# Patient Record
Sex: FEMALE | Race: WHITE | NOT HISPANIC OR LATINO | Employment: FULL TIME | URBAN - METROPOLITAN AREA
[De-identification: names, ages, dates, MRNs, and addresses within clinical notes are randomized per-mention and may not be internally consistent; named-entity substitution may affect disease eponyms.]

---

## 2017-03-03 ENCOUNTER — OFFICE VISIT (OUTPATIENT)
Dept: FAMILY MEDICINE CLINIC | Facility: CLINIC | Age: 27
End: 2017-03-03

## 2017-03-03 VITALS
BODY MASS INDEX: 40.88 KG/M2 | DIASTOLIC BLOOD PRESSURE: 78 MMHG | OXYGEN SATURATION: 99 % | HEART RATE: 103 BPM | HEIGHT: 69 IN | WEIGHT: 276 LBS | SYSTOLIC BLOOD PRESSURE: 144 MMHG

## 2017-03-03 DIAGNOSIS — R03.0 ELEVATED BLOOD PRESSURE (NOT HYPERTENSION): Primary | ICD-10-CM

## 2017-03-03 DIAGNOSIS — E28.2 PCOS (POLYCYSTIC OVARIAN SYNDROME): ICD-10-CM

## 2017-03-03 LAB — HBA1C MFR BLD: 5.6 %

## 2017-03-03 PROCEDURE — 83036 HEMOGLOBIN GLYCOSYLATED A1C: CPT | Performed by: FAMILY MEDICINE

## 2017-03-03 PROCEDURE — 99213 OFFICE O/P EST LOW 20 MIN: CPT | Performed by: FAMILY MEDICINE

## 2017-03-03 RX ORDER — DROSPIRENONE AND ETHINYL ESTRADIOL 0.03MG-3MG
1 KIT ORAL DAILY
Qty: 28 TABLET | Refills: 12 | Status: SHIPPED | OUTPATIENT
Start: 2017-03-03 | End: 2018-02-05 | Stop reason: SDUPTHER

## 2017-03-03 NOTE — PROGRESS NOTES
Subjective   Windy Lam is a 26 y.o. female.     Chief Complaint   Patient presents with   • Polycystic Ovary Syndrome     Social History   Substance Use Topics   • Smoking status: Never Smoker   • Smokeless tobacco: None   • Alcohol use Yes      Comment: social       History of Present Illness     She has lost some weight. Her BP always goes up when she goes to the doctor or [facility]. She hasn't checked it any where else recently. Menses have been coming regularly. She is exercising at least three times weekly. Her dietary intake is better. Has been reducing her carbs and she feels like she is eating more healthily.      The following portions of the patient's history were reviewed and updated as appropriate: allergies, current medications, past social history and problem list.      Review of Systems   Constitutional: Negative for activity change, appetite change, chills, fatigue, fever and unexpected weight change.   HENT: Negative for congestion, ear pain, hearing loss, nosebleeds, rhinorrhea and sore throat.    Eyes: Negative for pain, redness and visual disturbance.   Respiratory: Negative for cough, shortness of breath and wheezing.    Cardiovascular: Negative for chest pain, palpitations and leg swelling.   Gastrointestinal: Negative for abdominal pain, blood in stool, constipation, diarrhea, nausea and vomiting.   Endocrine: Negative for cold intolerance and heat intolerance.   Genitourinary: Negative for difficulty urinating, dysuria, frequency, hematuria, pelvic pain, urgency and vaginal discharge.   Musculoskeletal: Negative for arthralgias, back pain and joint swelling.   Skin: Negative for rash and wound.   Neurological: Negative for dizziness, weakness, numbness and headaches.   Hematological: Does not bruise/bleed easily.   Psychiatric/Behavioral: Negative for dysphoric mood, sleep disturbance and suicidal ideas. The patient is not nervous/anxious.        Objective   Vitals:    03/03/17 1315 03/03/17  "1341   BP: 160/100 144/78   Pulse: 103    SpO2: 99%    Weight: 276 lb (125 kg)    Height: 69\" (175.3 cm)      Body mass index is 40.76 kg/(m^2).    Physical Exam   Constitutional: She is oriented to person, place, and time. Vital signs are normal. She appears well-developed and well-nourished. No distress.   HENT:   Head: Normocephalic.   Cardiovascular: Normal rate, regular rhythm and normal heart sounds.    Pulmonary/Chest: Effort normal and breath sounds normal.   Neurological: She is alert and oriented to person, place, and time. Gait normal.   Psychiatric: She has a normal mood and affect. Her behavior is normal. Judgment and thought content normal.   Vitals reviewed.      Assessment/Plan   Problem List Items Addressed This Visit        Endocrine    PCOS (polycystic ovarian syndrome)    Relevant Medications    drospirenone-ethinyl estradiol (FELIPE,OCELLA) 3-0.03 MG per tablet    Other Relevant Orders    POC Glycosylated Hemoglobin (Hb A1C) (Completed)      Other Visit Diagnoses     Elevated blood pressure (not hypertension)    -  Primary    Patient will monitor her blood pressure.  She understands that if the pressure remains elevated it needs to be treated.  She has a large reactive component.           "

## 2017-09-15 ENCOUNTER — OFFICE VISIT (OUTPATIENT)
Dept: FAMILY MEDICINE CLINIC | Facility: CLINIC | Age: 27
End: 2017-09-15

## 2017-09-15 VITALS
HEIGHT: 69 IN | DIASTOLIC BLOOD PRESSURE: 84 MMHG | BODY MASS INDEX: 41.18 KG/M2 | WEIGHT: 278 LBS | HEART RATE: 104 BPM | OXYGEN SATURATION: 99 % | SYSTOLIC BLOOD PRESSURE: 152 MMHG

## 2017-09-15 DIAGNOSIS — F41.9 ANXIETY: Primary | ICD-10-CM

## 2017-09-15 PROCEDURE — 99213 OFFICE O/P EST LOW 20 MIN: CPT | Performed by: FAMILY MEDICINE

## 2017-09-15 RX ORDER — TRIAMCINOLONE ACETONIDE 1 MG/G
CREAM TOPICAL
Refills: 0 | COMMUNITY
Start: 2017-08-11 | End: 2019-03-15 | Stop reason: SDUPTHER

## 2017-09-15 RX ORDER — FERROUS SULFATE TAB EC 324 MG (65 MG FE EQUIVALENT) 324 (65 FE) MG
324 TABLET DELAYED RESPONSE ORAL
COMMUNITY
End: 2018-09-26

## 2017-09-15 NOTE — PROGRESS NOTES
Windy Lam is a 27 y.o. female.  Seen 09/15/2017    Assessment/Plan   Problem List Items Addressed This Visit        Other    Anxiety - Primary    Relevant Medications    sertraline (ZOLOFT) 50 MG tablet             No Follow-up on file.  There are no Patient Instructions on file for this visit.    Subjective     Chief Complaint   Patient presents with   • Anxiety     Social History   Substance Use Topics   • Smoking status: Never Smoker   • Smokeless tobacco: Never Used   • Alcohol use Yes      Comment: social       History of Present Illness     She has terrible anxiety. It's been going on for years but now it's worse for the last two years. Was given an antianxiety med then but didn't take it. She has had some panic attacks, 4-5 of those where she can't breathe. She is anxious every day. Her marriage and job is good.  She has some OCD behaviors, for instance right now she is obsessing about her hair falling out. Denies alcohol or substance use. She is exercising four times a week and that may be helping some. She tosses and turns -- difficulty staying asleep. Denies depression symptoms. Grandmother maybe has some anxiety issues. Not hormonally related. She did some counseling through [facility] about a year ago for depression. It helped the depression but not so much the anxiety. Not tried meditation.     Having some sore throat and URI symptoms since Monday but that is the secondary.    The following portions of the patient's history were reviewed and updated as appropriate:PMHroutine: Social history , Allergies, Current Medications, Active Problem List and Health Maintenance    Review of Systems   Constitutional: Negative for activity change, appetite change, chills, fatigue, fever and unexpected weight change.   HENT: Positive for postnasal drip. Negative for congestion, ear pain, hearing loss, nosebleeds, rhinorrhea and sore throat.    Eyes: Negative for pain, redness and visual disturbance.   Respiratory:  "Positive for cough. Negative for shortness of breath and wheezing.    Cardiovascular: Negative for chest pain, palpitations and leg swelling.   Gastrointestinal: Negative for abdominal pain, blood in stool, constipation, diarrhea, nausea and vomiting.   Endocrine: Negative for cold intolerance and heat intolerance.   Genitourinary: Negative for difficulty urinating, dysuria, frequency, hematuria, pelvic pain, urgency and vaginal discharge.   Musculoskeletal: Negative for arthralgias, back pain and joint swelling.   Skin: Negative for rash and wound.   Neurological: Negative for dizziness, weakness, numbness and headaches.   Hematological: Does not bruise/bleed easily.   Psychiatric/Behavioral: Positive for agitation and sleep disturbance. Negative for dysphoric mood and suicidal ideas. The patient is nervous/anxious.        Objective   Vitals:    09/15/17 1332   BP: 152/84   Pulse: 104   SpO2: 99%   Weight: 278 lb (126 kg)   Height: 69\" (175.3 cm)     Body mass index is 41.05 kg/(m^2).  Physical Exam   Constitutional: She appears well-developed and well-nourished.   Psychiatric: Her behavior is normal. Judgment and thought content normal. Her mood appears anxious. Her speech is rapid and/or pressured.   Vitals reviewed.    Reviewed Data:  No visits with results within 1 Month(s) from this visit.  Latest known visit with results is:    Office Visit on 03/03/2017   Component Date Value Ref Range Status   • Hemoglobin A1C 03/03/2017 5.6  % Final         "

## 2018-01-26 ENCOUNTER — OFFICE VISIT (OUTPATIENT)
Dept: FAMILY MEDICINE CLINIC | Facility: CLINIC | Age: 28
End: 2018-01-26

## 2018-01-26 VITALS
SYSTOLIC BLOOD PRESSURE: 148 MMHG | HEIGHT: 69 IN | BODY MASS INDEX: 41.77 KG/M2 | OXYGEN SATURATION: 100 % | WEIGHT: 282 LBS | DIASTOLIC BLOOD PRESSURE: 90 MMHG | HEART RATE: 95 BPM

## 2018-01-26 DIAGNOSIS — H93.11 TINNITUS OF RIGHT EAR: Primary | ICD-10-CM

## 2018-01-26 DIAGNOSIS — R03.0 ELEVATED BP WITHOUT DIAGNOSIS OF HYPERTENSION: ICD-10-CM

## 2018-01-26 DIAGNOSIS — H92.01 RIGHT EAR PAIN: ICD-10-CM

## 2018-01-26 PROCEDURE — 99213 OFFICE O/P EST LOW 20 MIN: CPT | Performed by: FAMILY MEDICINE

## 2018-01-26 NOTE — PROGRESS NOTES
Windy Lam is a 27 y.o. female.      Assessment/Plan   Problem List Items Addressed This Visit     None      Visit Diagnoses     Tinnitus of right ear    -  Primary    Monitor for one week. If persists needs to see ENT.     Right ear pain        Elevated BP without diagnosis of hypertension        Monitor BP with elevated pressure persistent will need tx.              Return if symptoms worsen or fail to improve.  There are no Patient Instructions on file for this visit.    Chief Complaint   Patient presents with   • Earache     x 5 days      Social History   Substance Use Topics   • Smoking status: Never Smoker   • Smokeless tobacco: Never Used   • Alcohol use Yes      Comment: social       History of Present Illness     Right ear feels full. Nothing makes it better or worse. Trying to pop it she hasn't tried. No popping sounds in it. Wants to put her ear on a pillow though that doesn't help. Not quite sharp -- no particular time of the day that it hurts and it gets better and worse throughout the day. No recent cold. Allergies are not bad. Not aware that she grinds her teeth. Hasn't taken anything for it. Also having ringing in that ear only.     Having episodes where her face gets red and hot almost every day. Feeling it today and BP is elevated here.     The following portions of the patient's history were reviewed and updated as appropriate:PMHroutine: Social history , Allergies, Current Medications, Active Problem List and Health Maintenance    Review of Systems   Constitutional: Negative for activity change, appetite change, chills, fatigue, fever and unexpected weight change.   HENT: Positive for ear pain. Negative for congestion, hearing loss, nosebleeds, rhinorrhea and sore throat.    Eyes: Negative for pain, redness and visual disturbance.   Respiratory: Negative for cough, shortness of breath and wheezing.    Cardiovascular: Negative for chest pain, palpitations and leg swelling.   Gastrointestinal:  "Negative for abdominal pain, blood in stool, constipation, diarrhea, nausea and vomiting.   Endocrine: Negative for cold intolerance and heat intolerance.   Genitourinary: Negative for difficulty urinating, dysuria, frequency, hematuria, pelvic pain, urgency and vaginal discharge.   Musculoskeletal: Negative for arthralgias, back pain and joint swelling.   Skin: Negative for rash and wound.   Neurological: Negative for dizziness, weakness, numbness and headaches.   Hematological: Does not bruise/bleed easily.   Psychiatric/Behavioral: Negative for dysphoric mood, sleep disturbance and suicidal ideas. The patient is not nervous/anxious.        Objective   Vitals:    01/26/18 1050   BP: 148/90   Pulse: 95   SpO2: 100%   Weight: 128 kg (282 lb)   Height: 175.3 cm (69.02\")     Body mass index is 41.62 kg/(m^2).  Physical Exam   Constitutional: She appears well-developed and well-nourished.   HENT:   Right Ear: Tympanic membrane, external ear and ear canal normal.   Left Ear: Tympanic membrane, external ear and ear canal normal.   Eyes: Conjunctivae are normal.   Neck: Normal range of motion. Neck supple. No thyromegaly present.   Lymphadenopathy:     She has no cervical adenopathy.   Vitals reviewed.    Reviewed Data:        "

## 2018-02-01 DIAGNOSIS — H93.11 TINNITUS, RIGHT EAR: Primary | ICD-10-CM

## 2018-02-05 ENCOUNTER — OFFICE VISIT (OUTPATIENT)
Dept: FAMILY MEDICINE CLINIC | Facility: CLINIC | Age: 28
End: 2018-02-05

## 2018-02-05 VITALS
OXYGEN SATURATION: 99 % | SYSTOLIC BLOOD PRESSURE: 138 MMHG | HEART RATE: 96 BPM | HEIGHT: 69 IN | DIASTOLIC BLOOD PRESSURE: 90 MMHG

## 2018-02-05 DIAGNOSIS — E28.2 PCOS (POLYCYSTIC OVARIAN SYNDROME): ICD-10-CM

## 2018-02-05 DIAGNOSIS — I10 BENIGN ESSENTIAL HYPERTENSION: Primary | ICD-10-CM

## 2018-02-05 PROCEDURE — 99213 OFFICE O/P EST LOW 20 MIN: CPT | Performed by: FAMILY MEDICINE

## 2018-02-05 RX ORDER — LISINOPRIL 10 MG/1
10 TABLET ORAL DAILY
Qty: 30 TABLET | Refills: 1 | Status: SHIPPED | OUTPATIENT
Start: 2018-02-05 | End: 2018-04-03 | Stop reason: SDUPTHER

## 2018-02-05 RX ORDER — AMOXICILLIN 500 MG/1
CAPSULE ORAL
Refills: 0 | COMMUNITY
Start: 2018-02-02 | End: 2018-04-03

## 2018-02-05 RX ORDER — DROSPIRENONE AND ETHINYL ESTRADIOL 0.03MG-3MG
1 KIT ORAL DAILY
Qty: 84 TABLET | Refills: 0 | Status: SHIPPED | OUTPATIENT
Start: 2018-02-05 | End: 2018-04-03 | Stop reason: SDUPTHER

## 2018-02-05 NOTE — PROGRESS NOTES
Windy Lam is a 27 y.o. female.      Assessment/Plan   Problem List Items Addressed This Visit        Cardiovascular and Mediastinum    Benign essential hypertension - Primary    [facility]  Disc lisinopril side effects and hopefully with lower BP it will take care of the tinnitis.             Endocrine    PCOS (polycystic ovarian syndrome)    Overview     [facility]  She never gets the same OCP generic cali in a row and having menstrual irregularities and other issues she thinks is re to the constant changing of the hormone levels.                   No Follow-up on file.  There are no Patient Instructions on file for this visit.    Chief Complaint   Patient presents with   • Hypertension     [facility] F/U 2/2/18   • Tinnitus     Social History   Substance Use Topics   • Smoking status: Never Smoker   • Smokeless tobacco: Never Used   • Alcohol use Yes      Comment: social       History of Present Illness     She has been tracking her BPs She's been running elevated BPs pretty routinely since she left here. She is having elevations in different places. She is quite anxious. She is working out regularly, drinking water and caffeine free drinks, and still feels pretty anxious. She has tried taking an antianxiety med and she felt very anxious on it.     The following portions of the patient's history were reviewed and updated as appropriate:PMHroutine: Social history , Allergies, Current Medications, Active Problem List and Health Maintenance    Review of Systems   Constitutional: Negative for activity change, appetite change, chills, fatigue, fever and unexpected weight change.   HENT: Positive for tinnitus. Negative for congestion, ear pain, hearing loss, nosebleeds, rhinorrhea and sore throat.    Eyes: Negative for pain, redness and visual disturbance.   Respiratory: Negative for cough, shortness of breath and wheezing.    Cardiovascular: Negative for chest pain, palpitations and  "leg swelling.   Gastrointestinal: Negative for abdominal pain, blood in stool, constipation, diarrhea, nausea and vomiting.   Endocrine: Negative for cold intolerance and heat intolerance.   Genitourinary: Negative for difficulty urinating, dysuria, frequency, hematuria, pelvic pain, urgency and vaginal discharge.   Musculoskeletal: Negative for arthralgias, back pain and joint swelling.   Skin: Negative for rash and wound.   Neurological: Positive for dizziness. Negative for weakness, numbness and headaches.   Hematological: Does not bruise/bleed easily.   Psychiatric/Behavioral: Negative for dysphoric mood, sleep disturbance and suicidal ideas. The patient is not nervous/anxious.        Objective   Vitals:    02/05/18 1501   BP: 138/90   Pulse: 96   SpO2: 99%   Height: 175.3 cm (69.02\")     There is no height or weight on file to calculate BMI.  Physical Exam   Constitutional: She is oriented to person, place, and time. Vital signs are normal. She appears well-developed and well-nourished. No distress.   HENT:   Head: Normocephalic.   Cardiovascular: Normal rate, regular rhythm and normal heart sounds.    Pulmonary/Chest: Effort normal and breath sounds normal.   Neurological: She is alert and oriented to person, place, and time. Gait normal.   Psychiatric: She has a normal mood and affect. Her behavior is normal. Judgment and thought content normal.   Vitals reviewed.    Reviewed Data:  No visits with results within 1 Month(s) from this visit.  Latest known visit with results is:    Office Visit on 03/03/2017   Component Date Value Ref Range Status   • Hemoglobin A1C 03/03/2017 5.6  % Final         "

## 2018-04-02 NOTE — PROGRESS NOTES
Problem List Items Addressed This Visit        Cardiovascular and Mediastinum    Benign essential hypertension    Current Assessment & Plan     [facility] 4/3/2018  BP improved. Tinnitius resolved. Feels much better. RTO in six months. Cont med.          Relevant Medications    lisinopril (PRINIVIL,ZESTRIL) 10 MG tablet          Return in about 6 months (around 10/3/2018).    Windy Lam is a 27 y.o. female being seen in our office today for Hypertension (follow up)                 She  reports that she has never smoked. She has never used smokeless tobacco. She reports that she drinks alcohol. She reports that she does not use drugs.             HPI Patient is here for follow-up of medication begun approximately one month ago for her hypertension. Patient has not been checking  her blood pressure.She has not had side effects with the medication. She feels better since beginning the meds. She is compliant with take the new med. She feels much better!!!!             The following portions of the patient's history were reviewed and updated as appropriate:PMHroutine: Social history , Allergies, Current Medications, Active Problem List and Health Maintenance            Review of Systems   Constitutional: Negative for activity change, appetite change, chills, fatigue, fever and unexpected weight change.   HENT: Negative for congestion, ear pain, hearing loss, nosebleeds, rhinorrhea and sore throat.    Eyes: Negative for pain, redness and visual disturbance.   Respiratory: Negative for cough, shortness of breath and wheezing.    Cardiovascular: Negative for chest pain, palpitations and leg swelling.   Gastrointestinal: Negative for abdominal pain, blood in stool, constipation, diarrhea, nausea and vomiting.   Endocrine: Negative for cold intolerance and heat intolerance.   Genitourinary: Negative for difficulty urinating, dysuria, frequency, hematuria, pelvic pain, urgency and vaginal discharge.   Musculoskeletal:  Negative for arthralgias, back pain and joint swelling.   Skin: Negative for rash and wound.   Neurological: Negative for dizziness, weakness, numbness and headaches.   Hematological: Does not bruise/bleed easily.   Psychiatric/Behavioral: Negative for dysphoric mood, sleep disturbance and suicidal ideas. The patient is not nervous/anxious.                  BP Readings from Last 1 Encounters:   04/03/18 120/80     Wt Readings from Last 3 Encounters:   04/03/18 125 kg (276 lb 8 oz)   01/26/18 128 kg (282 lb)   09/15/17 126 kg (278 lb)   Body mass index is 40.83 kg/m².                 Physical Exam   Constitutional: She appears well-developed and well-nourished.   Psychiatric: She has a normal mood and affect. Her behavior is normal. Judgment and thought content normal.   Vitals reviewed.                   cali refill -- fast in six months.

## 2018-04-03 ENCOUNTER — OFFICE VISIT (OUTPATIENT)
Dept: FAMILY MEDICINE CLINIC | Facility: CLINIC | Age: 28
End: 2018-04-03

## 2018-04-03 VITALS
WEIGHT: 276.5 LBS | BODY MASS INDEX: 40.95 KG/M2 | HEIGHT: 69 IN | DIASTOLIC BLOOD PRESSURE: 80 MMHG | OXYGEN SATURATION: 98 % | SYSTOLIC BLOOD PRESSURE: 120 MMHG | HEART RATE: 90 BPM | RESPIRATION RATE: 16 BRPM | TEMPERATURE: 98.9 F

## 2018-04-03 DIAGNOSIS — I10 BENIGN ESSENTIAL HYPERTENSION: ICD-10-CM

## 2018-04-03 PROCEDURE — 99212 OFFICE O/P EST SF 10 MIN: CPT | Performed by: FAMILY MEDICINE

## 2018-04-03 RX ORDER — LISINOPRIL 10 MG/1
10 TABLET ORAL DAILY
Qty: 90 TABLET | Refills: 1 | Status: SHIPPED | OUTPATIENT
Start: 2018-04-03 | End: 2018-09-14 | Stop reason: SDUPTHER

## 2018-04-03 RX ORDER — DROSPIRENONE AND ETHINYL ESTRADIOL 0.03MG-3MG
1 KIT ORAL DAILY
Qty: 84 TABLET | Refills: 0 | Status: SHIPPED | OUTPATIENT
Start: 2018-04-03 | End: 2018-12-31

## 2018-04-03 RX ORDER — MULTIPLE VITAMINS W/ MINERALS TAB 9MG-400MCG
1 TAB ORAL DAILY
COMMUNITY
End: 2020-05-21

## 2018-04-03 NOTE — ASSESSMENT & PLAN NOTE
Andree 4/3/2018  BP improved. Tinnitius resolved. Feels much better. RTO in six months. Cont med.

## 2018-09-14 ENCOUNTER — OFFICE VISIT (OUTPATIENT)
Dept: FAMILY MEDICINE CLINIC | Facility: CLINIC | Age: 28
End: 2018-09-14

## 2018-09-14 VITALS
HEART RATE: 88 BPM | BODY MASS INDEX: 41.92 KG/M2 | RESPIRATION RATE: 16 BRPM | WEIGHT: 283 LBS | DIASTOLIC BLOOD PRESSURE: 80 MMHG | HEIGHT: 69 IN | OXYGEN SATURATION: 100 % | SYSTOLIC BLOOD PRESSURE: 132 MMHG

## 2018-09-14 DIAGNOSIS — E28.2 PCOS (POLYCYSTIC OVARIAN SYNDROME): Primary | ICD-10-CM

## 2018-09-14 DIAGNOSIS — Z01.89 ROUTINE LAB DRAW: ICD-10-CM

## 2018-09-14 DIAGNOSIS — E66.01 MORBID OBESITY WITH BMI OF 40.0-44.9, ADULT (HCC): ICD-10-CM

## 2018-09-14 DIAGNOSIS — I10 BENIGN ESSENTIAL HYPERTENSION: ICD-10-CM

## 2018-09-14 LAB
ALBUMIN SERPL-MCNC: 4.5 G/DL (ref 3.5–5.2)
ALBUMIN/GLOB SERPL: 1.6 G/DL
ALP SERPL-CCNC: 58 U/L (ref 39–117)
ALT SERPL-CCNC: 10 U/L (ref 1–33)
AST SERPL-CCNC: 12 U/L (ref 1–32)
BASOPHILS # BLD AUTO: 0.05 10*3/MM3 (ref 0–0.2)
BASOPHILS NFR BLD AUTO: 0.4 % (ref 0–1.5)
BILIRUB SERPL-MCNC: 0.3 MG/DL (ref 0.1–1.2)
BUN SERPL-MCNC: 8 MG/DL (ref 6–20)
BUN/CREAT SERPL: 14.5 (ref 7–25)
CALCIUM SERPL-MCNC: 9.4 MG/DL (ref 8.6–10.5)
CHLORIDE SERPL-SCNC: 103 MMOL/L (ref 98–107)
CHOLEST SERPL-MCNC: 172 MG/DL (ref 0–200)
CO2 SERPL-SCNC: 25 MMOL/L (ref 22–29)
CREAT SERPL-MCNC: 0.55 MG/DL (ref 0.57–1)
EOSINOPHIL # BLD AUTO: 0.52 10*3/MM3 (ref 0–0.7)
EOSINOPHIL NFR BLD AUTO: 4.1 % (ref 0.3–6.2)
ERYTHROCYTE [DISTWIDTH] IN BLOOD BY AUTOMATED COUNT: 14.4 % (ref 11.7–13)
GLOBULIN SER CALC-MCNC: 2.9 GM/DL
GLUCOSE SERPL-MCNC: 94 MG/DL (ref 65–99)
HBA1C MFR BLD: 5.7 % (ref 4.8–5.6)
HCT VFR BLD AUTO: 39.4 % (ref 35.6–45.5)
HDLC SERPL-MCNC: 56 MG/DL (ref 40–60)
HGB BLD-MCNC: 12.1 G/DL (ref 11.9–15.5)
IMM GRANULOCYTES # BLD: 0.04 10*3/MM3 (ref 0–0.03)
IMM GRANULOCYTES NFR BLD: 0.3 % (ref 0–0.5)
LDLC SERPL CALC-MCNC: 91 MG/DL (ref 0–100)
LDLC/HDLC SERPL: 1.62 {RATIO}
LYMPHOCYTES # BLD AUTO: 3.55 10*3/MM3 (ref 0.9–4.8)
LYMPHOCYTES NFR BLD AUTO: 28.3 % (ref 19.6–45.3)
MCH RBC QN AUTO: 25.4 PG (ref 26.9–32)
MCHC RBC AUTO-ENTMCNC: 30.7 G/DL (ref 32.4–36.3)
MCV RBC AUTO: 82.6 FL (ref 80.5–98.2)
MONOCYTES # BLD AUTO: 0.68 10*3/MM3 (ref 0.2–1.2)
MONOCYTES NFR BLD AUTO: 5.4 % (ref 5–12)
NEUTROPHILS # BLD AUTO: 7.75 10*3/MM3 (ref 1.9–8.1)
NEUTROPHILS NFR BLD AUTO: 61.8 % (ref 42.7–76)
PLATELET # BLD AUTO: 333 10*3/MM3 (ref 140–500)
POTASSIUM SERPL-SCNC: 4.6 MMOL/L (ref 3.5–5.2)
PROT SERPL-MCNC: 7.4 G/DL (ref 6–8.5)
RBC # BLD AUTO: 4.77 10*6/MM3 (ref 3.9–5.2)
SODIUM SERPL-SCNC: 141 MMOL/L (ref 136–145)
TRIGL SERPL-MCNC: 127 MG/DL (ref 0–150)
VLDLC SERPL CALC-MCNC: 25.4 MG/DL (ref 5–40)
WBC # BLD AUTO: 12.55 10*3/MM3 (ref 4.5–10.7)

## 2018-09-14 PROCEDURE — 99214 OFFICE O/P EST MOD 30 MIN: CPT | Performed by: FAMILY MEDICINE

## 2018-09-14 PROCEDURE — 90471 IMMUNIZATION ADMIN: CPT | Performed by: FAMILY MEDICINE

## 2018-09-14 PROCEDURE — 90686 IIV4 VACC NO PRSV 0.5 ML IM: CPT | Performed by: FAMILY MEDICINE

## 2018-09-14 RX ORDER — LISINOPRIL 20 MG/1
20 TABLET ORAL DAILY
Qty: 90 TABLET | Refills: 1 | Status: SHIPPED | OUTPATIENT
Start: 2018-09-14 | End: 2019-03-15 | Stop reason: SDUPTHER

## 2018-09-14 RX ORDER — METFORMIN HYDROCHLORIDE EXTENDED-RELEASE TABLETS 1000 MG/1
1000 TABLET, FILM COATED, EXTENDED RELEASE ORAL
Qty: 90 TABLET | Refills: 1 | Status: SHIPPED | OUTPATIENT
Start: 2018-09-14 | End: 2018-09-17 | Stop reason: SDUPTHER

## 2018-09-14 NOTE — ASSESSMENT & PLAN NOTE
Andree 9/14/2018    Obesity is worsening.  Discussed the patient's BMI.  The BMI is above average; BMI management plan is completed.  Regular aerobic exercise program discussed.  Pharmacotherapy as ordered.

## 2018-09-14 NOTE — PROGRESS NOTES
Problem List Items Addressed This Visit        Cardiovascular and Mediastinum    Benign essential hypertension    Current Assessment & Plan     [facility] 9/14/2018  BP up when she is checking. I'd like to keep your systolic under 130. Will increase to 20 mg on the lisinopril         Relevant Medications    lisinopril (PRINIVIL,ZESTRIL) 20 MG tablet    Other Relevant Orders    Comprehensive metabolic panel       Digestive    Morbid obesity with BMI of 40.0-44.9, adult (CMS/[facility])    Current Assessment & Plan     [facility] 9/14/2018    Obesity is worsening.  Discussed the patient's BMI.  The BMI is above average; BMI management plan is completed.  Regular aerobic exercise program discussed.  Pharmacotherapy as ordered.            Endocrine    PCOS (polycystic ovarian syndrome) - Primary    Overview     [facility] 2/5/2018  She never gets the same OCP generic cali in a row and having menstrual irregularities and other issues she thinks is re to the constant changing of the hormone levels.          Current Assessment & Plan     [facility] 9/14/2018  Will resume metformin today and check labs         Relevant Medications    metFORMIN (FORTAMET) 1000 MG (OSM) 24 hr tablet    Other Relevant Orders    Hemoglobin A1c      Other Visit Diagnoses     Routine lab draw        Relevant Orders    Hemoglobin A1c    Comprehensive metabolic panel    Lipid Panel With LDL/HDL Ratio    CBC and Differential             Return in about 6 months (around 3/14/2019).  There are no Patient Instructions on file for this visit.  Windy Lam is a 28 y.o. female being seen in our office today for Hypertension                 She  reports that she has never smoked. She has never used smokeless tobacco. She reports that she drinks alcohol. She reports that she does not use drugs.             HPI She is checking her BP on occasion, getting 151 at a doctor's office. Noticing more symptoms with it -- stars in her eyes.     She would like to go back on  the metformin, she is exercising.              The following portions of the patient's history were reviewed and updated as appropriate:PMHroutine: Social history , Allergies, Current Medications, Active Problem List and Health Maintenance            Review of Systems   Constitutional: Negative for activity change, appetite change, chills, fatigue, fever and unexpected weight change.   HENT: Negative for congestion, ear pain, hearing loss, nosebleeds, rhinorrhea and sore throat.    Eyes: Negative for pain, redness and visual disturbance.   Respiratory: Negative for cough, shortness of breath and wheezing.    Cardiovascular: Negative for chest pain, palpitations and leg swelling.   Gastrointestinal: Negative for abdominal pain, blood in stool, constipation, diarrhea, nausea and vomiting.   Endocrine: Negative for cold intolerance and heat intolerance.   Genitourinary: Negative for difficulty urinating, dysuria, frequency, hematuria, pelvic pain, urgency and vaginal discharge.   Musculoskeletal: Negative for arthralgias, back pain and joint swelling.   Skin: Negative for rash and wound.   Neurological: Negative for dizziness, weakness, numbness and headaches.   Hematological: Does not bruise/bleed easily.   Psychiatric/Behavioral: Negative for dysphoric mood, sleep disturbance and suicidal ideas. The patient is not nervous/anxious.                  BP Readings from Last 1 Encounters:   09/14/18 132/80     Wt Readings from Last 3 Encounters:   09/14/18 128 kg (283 lb)   04/03/18 125 kg (276 lb 8 oz)   01/26/18 128 kg (282 lb)   Body mass index is 41.79 kg/m².                 Physical Exam   Constitutional: She is oriented to person, place, and time. Vital signs are normal. She appears well-developed and well-nourished. No distress.   HENT:   Head: Normocephalic.   Cardiovascular: Normal rate, regular rhythm and normal heart sounds.    Pulmonary/Chest: Effort normal and breath sounds normal.   Neurological: She is alert  and oriented to person, place, and time. Gait normal.   Psychiatric: She has a normal mood and affect. Her behavior is normal. Judgment and thought content normal.   Vitals reviewed.

## 2018-09-17 RX ORDER — METFORMIN HYDROCHLORIDE 500 MG/1
500 TABLET, EXTENDED RELEASE ORAL
Qty: 180 TABLET | Refills: 1 | Status: SHIPPED | OUTPATIENT
Start: 2018-09-17 | End: 2019-03-15 | Stop reason: SDUPTHER

## 2018-09-18 PROBLEM — D72.829 LEUKOCYTOSIS: Status: ACTIVE | Noted: 2018-09-18

## 2018-09-26 ENCOUNTER — OFFICE VISIT (OUTPATIENT)
Dept: FAMILY MEDICINE CLINIC | Facility: CLINIC | Age: 28
End: 2018-09-26

## 2018-09-26 VITALS
WEIGHT: 285.6 LBS | HEIGHT: 69 IN | BODY MASS INDEX: 42.3 KG/M2 | RESPIRATION RATE: 16 BRPM | DIASTOLIC BLOOD PRESSURE: 82 MMHG | HEART RATE: 100 BPM | SYSTOLIC BLOOD PRESSURE: 132 MMHG | OXYGEN SATURATION: 98 %

## 2018-09-26 DIAGNOSIS — L30.4 INTERTRIGO: Primary | ICD-10-CM

## 2018-09-26 PROCEDURE — 99213 OFFICE O/P EST LOW 20 MIN: CPT | Performed by: NURSE PRACTITIONER

## 2018-09-26 RX ORDER — NYSTATIN AND TRIAMCINOLONE ACETONIDE 100000; 1 [USP'U]/G; MG/G
OINTMENT TOPICAL EVERY 12 HOURS SCHEDULED
Qty: 60 G | Refills: 1 | Status: SHIPPED | OUTPATIENT
Start: 2018-09-26 | End: 2019-03-15 | Stop reason: SDUPTHER

## 2018-09-26 NOTE — PATIENT INSTRUCTIONS
Intertrigo  Intertrigo is skin irritation (inflammation) that happens in warm, moist areas of the body. The irritation can cause a rash and make skin raw and itchy. The rash is usually pink or red. It happens mostly between folds of skin or where skin rubs together, such as:  · Toes.  · Armpits.  · Groin.  · Belly.  · Breasts.  · Buttocks.    This condition is not passed from person to person (is not contagious).  Follow these instructions at home:  · Keep the affected area clean and dry.  · Do not scratch your skin.  · Stay cool as much as possible. Use an air conditioner or fan, if you can.  · Apply over-the-counter and prescription medicines only as told by your doctor.  · If you were prescribed an antibiotic medicine, use it as told by your doctor. Do not stop using the antibiotic even if your condition starts to get better.  · Keep all follow-up visits as told by your doctor. This is important.  How is this prevented?  · Stay at a healthy weight.  · Keep your feet dry. This is very important if you have diabetes. Wear cotton or wool socks.  · Take care of and protect the skin in your groin and butt area as told by your doctor.  · Do not wear tight clothes. Wear clothes that:  ? Are loose.  ? Take away moisture from your body.  ? Are made of cotton.  · Wear a bra that gives good support, if needed.  · Shower and dry yourself fully after being active.  · Keep your blood sugar under control if you have diabetes.  Contact a doctor if:  · Your symptoms do not get better with treatment.  · Your symptoms get worse or they spread.  · You notice more redness and warmth.  · You have a fever.  This information is not intended to replace advice given to you by your health care provider. Make sure you discuss any questions you have with your health care provider.  Document Released: 01/20/2012 Document Revised: 05/25/2017 Document Reviewed: 06/20/2016  [employer] Interactive Patient Education © 2018 [employer]

## 2018-09-26 NOTE — PROGRESS NOTES
Windy Lam is a 28 y.o. female. Pt is here for inguinal rash. Pt has eczema, and tried to use Kenalog cream for it, but it's already a month looking bad, and Kenalog doesn't help. She says mainly in her L inguinal area, and can noticed a little in her R side as well. New patient to me. She is not diabetic.      Assessment/Plan   Problem List Items Addressed This Visit     None      Visit Diagnoses     Intertrigo    -  Primary    Relevant Medications    nystatin-triamcinolone (MYCOLOG) 670538-3.1 UNIT/GM-% ointment             Return for Annual.  Patient Instructions   Intertrigo  Intertrigo is skin irritation (inflammation) that happens in warm, moist areas of the body. The irritation can cause a rash and make skin raw and itchy. The rash is usually pink or red. It happens mostly between folds of skin or where skin rubs together, such as:  · Toes.  · Armpits.  · Groin.  · Belly.  · Breasts.  · Buttocks.    This condition is not passed from person to person (is not contagious).  Follow these instructions at home:  · Keep the affected area clean and dry.  · Do not scratch your skin.  · Stay cool as much as possible. Use an air conditioner or fan, if you can.  · Apply over-the-counter and prescription medicines only as told by your doctor.  · If you were prescribed an antibiotic medicine, use it as told by your doctor. Do not stop using the antibiotic even if your condition starts to get better.  · Keep all follow-up visits as told by your doctor. This is important.  How is this prevented?  · Stay at a healthy weight.  · Keep your feet dry. This is very important if you have diabetes. Wear cotton or wool socks.  · Take care of and protect the skin in your groin and butt area as told by your doctor.  · Do not wear tight clothes. Wear clothes that:  ? Are loose.  ? Take away moisture from your body.  ? Are made of cotton.  · Wear a bra that gives good support, if needed.  · Shower and dry yourself fully after being  "active.  · Keep your blood sugar under control if you have diabetes.  Contact a doctor if:  · Your symptoms do not get better with treatment.  · Your symptoms get worse or they spread.  · You notice more redness and warmth.  · You have a fever.  This information is not intended to replace advice given to you by your health care provider. Make sure you discuss any questions you have with your health care provider.  Document Released: 01/20/2012 Document Revised: 05/25/2017 Document Reviewed: 06/20/2016  [employer] Interactive Patient Education © 2018 [employer]        Chief Complaint   Patient presents with   • Rash     inguinal      Social History   Substance Use Topics   • Smoking status: Never Smoker   • Smokeless tobacco: Never Used   • Alcohol use Yes      Comment: social       History of Present Illness     The following portions of the patient's history were reviewed and updated as appropriate:PMHroutine: Social history , Allergies, Current Medications, Active Problem List and Health Maintenance    Review of Systems   Skin: Positive for color change and rash.       Objective   Vitals:    09/26/18 1046   BP: 132/82   BP Location: Left arm   Pulse: 100   Resp: 16   SpO2: 98%   Weight: 130 kg (285 lb 9.6 oz)   Height: 175.3 cm (69\")     Body mass index is 42.18 kg/m².  Physical Exam   Constitutional: She is oriented to person, place, and time. She appears well-developed and well-nourished.   HENT:   Head: Normocephalic.   Eyes: Pupils are equal, round, and reactive to light.   Neck: Normal range of motion.   Cardiovascular: Normal rate.    Abdominal: Soft.   Neurological: She is alert and oriented to person, place, and time.   Skin: Skin is warm and dry. Rash noted. There is erythema.   Left groin with erythematous rash with satellite lesions   Psychiatric: She has a normal mood and affect. Her behavior is normal. Judgment and thought content normal.   Nursing note and vitals reviewed.    Reviewed Data:  Office " Visit on 09/14/2018   Component Date Value Ref Range Status   • Hemoglobin A1C 09/14/2018 5.70* 4.80 - 5.60 % Final    Comment: Hemoglobin A1C Ranges:  Increased Risk for Diabetes  5.7% to 6.4%  Diabetes                     >= 6.5%  Diabetic Goal                < 7.0%     • Glucose 09/14/2018 94  65 - 99 mg/dL Final   • BUN 09/14/2018 8  6 - 20 mg/dL Final   • Creatinine 09/14/2018 0.55* 0.57 - 1.00 mg/dL Final   • eGFR Non  Am 09/14/2018 130  >60 mL/min/1.73 Final   • eGFR African Am 09/14/2018 >150  >60 mL/min/1.73 Final   • BUN/Creatinine Ratio 09/14/2018 14.5  7.0 - 25.0 Final   • Sodium 09/14/2018 141  136 - 145 mmol/L Final   • Potassium 09/14/2018 4.6  3.5 - 5.2 mmol/L Final   • Chloride 09/14/2018 103  98 - 107 mmol/L Final   • Total CO2 09/14/2018 25.0  22.0 - 29.0 mmol/L Final   • Calcium 09/14/2018 9.4  8.6 - 10.5 mg/dL Final   • Total Protein 09/14/2018 7.4  6.0 - 8.5 g/dL Final   • Albumin 09/14/2018 4.50  3.50 - 5.20 g/dL Final   • Globulin 09/14/2018 2.9  gm/dL Final   • A/G Ratio 09/14/2018 1.6  g/dL Final   • Total Bilirubin 09/14/2018 0.3  0.1 - 1.2 mg/dL Final   • Alkaline Phosphatase 09/14/2018 58  39 - 117 U/L Final   • AST (SGOT) 09/14/2018 12  1 - 32 U/L Final   • ALT (SGPT) 09/14/2018 10  1 - 33 U/L Final   • Total Cholesterol 09/14/2018 172  0 - 200 mg/dL Final   • Triglycerides 09/14/2018 127  0 - 150 mg/dL Final   • HDL Cholesterol 09/14/2018 56  40 - 60 mg/dL Final   • VLDL Cholesterol 09/14/2018 25.4  5 - 40 mg/dL Final   • LDL Cholesterol  09/14/2018 91  0 - 100 mg/dL Final   • LDL/HDL Ratio 09/14/2018 1.62   Final   • WBC 09/14/2018 12.55* 4.50 - 10.70 10*3/mm3 Final   • RBC 09/14/2018 4.77  3.90 - 5.20 10*6/mm3 Final   • Hemoglobin 09/14/2018 12.1  11.9 - 15.5 g/dL Final   • Hematocrit 09/14/2018 39.4  35.6 - 45.5 % Final   • MCV 09/14/2018 82.6  80.5 - 98.2 fL Final   • MCH 09/14/2018 25.4* 26.9 - 32.0 pg Final   • MCHC 09/14/2018 30.7* 32.4 - 36.3 g/dL Final   • RDW  09/14/2018 14.4* 11.7 - 13.0 % Final   • Platelets 09/14/2018 333  140 - 500 10*3/mm3 Final   • Neutrophil Rel % 09/14/2018 61.8  42.7 - 76.0 % Final   • Lymphocyte Rel % 09/14/2018 28.3  19.6 - 45.3 % Final   • Monocyte Rel % 09/14/2018 5.4  5.0 - 12.0 % Final   • Eosinophil Rel % 09/14/2018 4.1  0.3 - 6.2 % Final   • Basophil Rel % 09/14/2018 0.4  0.0 - 1.5 % Final   • Neutrophils Absolute 09/14/2018 7.75  1.90 - 8.10 10*3/mm3 Final   • Lymphocytes Absolute 09/14/2018 3.55  0.90 - 4.80 10*3/mm3 Final   • Monocytes Absolute 09/14/2018 0.68  0.20 - 1.20 10*3/mm3 Final   • Eosinophils Absolute 09/14/2018 0.52  0.00 - 0.70 10*3/mm3 Final   • Basophils Absolute 09/14/2018 0.05  0.00 - 0.20 10*3/mm3 Final   • Immature Granulocyte Rel % 09/14/2018 0.3  0.0 - 0.5 % Final   • Immature Grans Absolute 09/14/2018 0.04* 0.00 - 0.03 10*3/mm3 Final

## 2018-09-28 ENCOUNTER — TELEPHONE (OUTPATIENT)
Dept: FAMILY MEDICINE CLINIC | Facility: CLINIC | Age: 28
End: 2018-09-28

## 2018-09-28 NOTE — TELEPHONE ENCOUNTER
Pt called because medication no cover. We've called the pharmacy to do a verbal for the cream separately, and will be covered.

## 2018-12-18 ENCOUNTER — TELEPHONE (OUTPATIENT)
Dept: FAMILY MEDICINE CLINIC | Facility: CLINIC | Age: 28
End: 2018-12-18

## 2018-12-18 DIAGNOSIS — H93.13 RINGING IN EAR, BILATERAL: Primary | ICD-10-CM

## 2018-12-18 NOTE — TELEPHONE ENCOUNTER
Patient called stating that at last appt you offered to send her to ENT for ringing in ears. At the time she declined but wants to proceed now. Ok to place?

## 2018-12-20 DIAGNOSIS — I10 BENIGN ESSENTIAL HYPERTENSION: ICD-10-CM

## 2018-12-20 RX ORDER — LISINOPRIL 10 MG/1
TABLET ORAL
Qty: 90 TABLET | Refills: 0 | Status: SHIPPED | OUTPATIENT
Start: 2018-12-20 | End: 2018-12-31

## 2018-12-31 ENCOUNTER — OFFICE VISIT (OUTPATIENT)
Dept: FAMILY MEDICINE CLINIC | Facility: CLINIC | Age: 28
End: 2018-12-31

## 2018-12-31 VITALS
RESPIRATION RATE: 16 BRPM | DIASTOLIC BLOOD PRESSURE: 84 MMHG | WEIGHT: 285 LBS | SYSTOLIC BLOOD PRESSURE: 126 MMHG | OXYGEN SATURATION: 100 % | BODY MASS INDEX: 42.21 KG/M2 | HEART RATE: 93 BPM | HEIGHT: 69 IN

## 2018-12-31 DIAGNOSIS — J06.9 VIRAL UPPER RESPIRATORY TRACT INFECTION: ICD-10-CM

## 2018-12-31 DIAGNOSIS — R22.31 AXILLARY MASS, RIGHT: Primary | ICD-10-CM

## 2018-12-31 PROCEDURE — 99213 OFFICE O/P EST LOW 20 MIN: CPT | Performed by: FAMILY MEDICINE

## 2018-12-31 RX ORDER — BENZONATATE 200 MG/1
200 CAPSULE ORAL 3 TIMES DAILY PRN
Qty: 30 CAPSULE | Refills: 0 | Status: SHIPPED | OUTPATIENT
Start: 2018-12-31 | End: 2019-01-10

## 2018-12-31 NOTE — PROGRESS NOTES
Problem List Items Addressed This Visit     None      Visit Diagnoses     Axillary mass, right    -  Primary    Relevant Orders    Ambulatory Referral to General Surgery    Viral upper respiratory tract infection        Relevant Medications    benzonatate (TESSALON) 200 MG capsule         No Follow-up on file.  There are no Patient Instructions on file for this visit.  Windy Lam is a 28 y.o. female being seen in our office today for Mass (RT armpit) and URI (cough)                 She  reports that  has never smoked. she has never used smokeless tobacco. She reports that she drinks alcohol. She reports that she does not use drugs.             HPI Has a bump under her right armpit that is getting bigger. Hurts to press on it. Nothing coming out of it. Not red. Just under the skin.     She had a URI that is mostly gone but she persists with a dry cough. Worse at night when she tries to sleep. A little bit of wheezing and shortness of breath but doing better there. That has gotten much better.              The following portions of the patient's history were reviewed and updated as appropriate:PMHroutine: Social history , Allergies, Current Medications, Active Problem List and Health Maintenance            Review of Systems   Constitutional: Negative for activity change, appetite change, chills, fatigue, fever and unexpected weight change.   HENT: Positive for congestion. Negative for ear pain, hearing loss, nosebleeds, rhinorrhea and sore throat.    Eyes: Negative for pain, redness and visual disturbance.   Respiratory: Positive for cough. Negative for shortness of breath and wheezing.    Cardiovascular: Negative for chest pain, palpitations and leg swelling.   Gastrointestinal: Negative for abdominal pain, blood in stool, constipation, diarrhea, nausea and vomiting.   Endocrine: Negative for cold intolerance and heat intolerance.   Genitourinary: Negative for difficulty urinating, dysuria, frequency,  hematuria, pelvic pain, urgency and vaginal discharge.   Musculoskeletal: Negative for arthralgias, back pain and joint swelling.   Skin: Negative for rash and wound.   Neurological: Negative for dizziness, weakness, numbness and headaches.   Hematological: Does not bruise/bleed easily.   Psychiatric/Behavioral: Negative for dysphoric mood, sleep disturbance and suicidal ideas. The patient is not nervous/anxious.                 BP Readings from Last 1 Encounters:   12/31/18 126/84     Wt Readings from Last 3 Encounters:   12/31/18 129 kg (285 lb)   09/26/18 130 kg (285 lb 9.6 oz)   09/14/18 128 kg (283 lb)   Body mass index is 42.09 kg/m².             Physical Exam   Constitutional: She is oriented to person, place, and time. Vital signs are normal. She appears well-developed and well-nourished. No distress.   HENT:   Head: Normocephalic.   Cardiovascular: Normal rate, regular rhythm and normal heart sounds.   Pulmonary/Chest: Effort normal and breath sounds normal.   Neurological: She is alert and oriented to person, place, and time. Gait normal.   Psychiatric: She has a normal mood and affect. Her behavior is normal. Judgment and thought content normal.   Vitals reviewed.

## 2019-01-03 ENCOUNTER — TELEPHONE (OUTPATIENT)
Dept: FAMILY MEDICINE CLINIC | Facility: CLINIC | Age: 29
End: 2019-01-03

## 2019-01-03 RX ORDER — PROMETHAZINE HYDROCHLORIDE AND CODEINE PHOSPHATE 6.25; 1 MG/5ML; MG/5ML
5 SOLUTION ORAL EVERY 4 HOURS PRN
Qty: 118 ML | Refills: 0 | Status: SHIPPED | OUTPATIENT
Start: 2019-01-03 | End: 2019-03-15

## 2019-01-03 NOTE — TELEPHONE ENCOUNTER
I tried to give her phenergan with codeine and she didn't want a narcotic. We can call that in for her if you get me a lashonda and she is agreeable.

## 2019-01-03 NOTE — TELEPHONE ENCOUNTER
Patient states that the cough med you gave her is not working and she cannot sleep due to cough. Wants to know if there is anything else that can be sent in?

## 2019-03-15 ENCOUNTER — OFFICE VISIT (OUTPATIENT)
Dept: FAMILY MEDICINE CLINIC | Facility: CLINIC | Age: 29
End: 2019-03-15

## 2019-03-15 VITALS
HEIGHT: 69 IN | HEART RATE: 86 BPM | WEIGHT: 269 LBS | RESPIRATION RATE: 20 BRPM | SYSTOLIC BLOOD PRESSURE: 126 MMHG | BODY MASS INDEX: 39.84 KG/M2 | OXYGEN SATURATION: 99 % | DIASTOLIC BLOOD PRESSURE: 72 MMHG

## 2019-03-15 DIAGNOSIS — I10 BENIGN ESSENTIAL HYPERTENSION: ICD-10-CM

## 2019-03-15 DIAGNOSIS — E28.2 PCOS (POLYCYSTIC OVARIAN SYNDROME): Primary | ICD-10-CM

## 2019-03-15 DIAGNOSIS — L30.4 INTERTRIGO: ICD-10-CM

## 2019-03-15 PROCEDURE — 99213 OFFICE O/P EST LOW 20 MIN: CPT | Performed by: FAMILY MEDICINE

## 2019-03-15 RX ORDER — TRIAMCINOLONE ACETONIDE 1 MG/G
CREAM TOPICAL 2 TIMES DAILY
Qty: 80 G | Refills: 1 | Status: SHIPPED | OUTPATIENT
Start: 2019-03-15 | End: 2019-03-15 | Stop reason: SDUPTHER

## 2019-03-15 RX ORDER — NYSTATIN AND TRIAMCINOLONE ACETONIDE 100000; 1 [USP'U]/G; MG/G
OINTMENT TOPICAL EVERY 12 HOURS SCHEDULED
Qty: 60 G | Refills: 1 | Status: SHIPPED | OUTPATIENT
Start: 2019-03-15 | End: 2019-10-28

## 2019-03-15 RX ORDER — FLUOCINOLONE ACETONIDE 0.11 MG/ML
OIL AURICULAR (OTIC)
Refills: 1 | COMMUNITY
Start: 2019-01-14 | End: 2021-09-13

## 2019-03-15 RX ORDER — METFORMIN HYDROCHLORIDE 500 MG/1
500 TABLET, EXTENDED RELEASE ORAL
Qty: 180 TABLET | Refills: 1 | Status: SHIPPED | OUTPATIENT
Start: 2019-03-15 | End: 2019-05-22 | Stop reason: ALTCHOICE

## 2019-03-15 RX ORDER — NYSTATIN AND TRIAMCINOLONE ACETONIDE 100000; 1 [USP'U]/G; MG/G
OINTMENT TOPICAL EVERY 12 HOURS SCHEDULED
Qty: 60 G | Refills: 1 | Status: SHIPPED | OUTPATIENT
Start: 2019-03-15 | End: 2019-03-15 | Stop reason: SDUPTHER

## 2019-03-15 RX ORDER — NYSTATIN 100000 U/G
OINTMENT TOPICAL
Refills: 1 | COMMUNITY
Start: 2019-02-18 | End: 2019-03-15 | Stop reason: SDUPTHER

## 2019-03-15 RX ORDER — METFORMIN HYDROCHLORIDE 500 MG/1
500 TABLET, EXTENDED RELEASE ORAL
Qty: 180 TABLET | Refills: 1 | Status: SHIPPED | OUTPATIENT
Start: 2019-03-15 | End: 2019-03-15 | Stop reason: SDUPTHER

## 2019-03-15 RX ORDER — TRIAMCINOLONE ACETONIDE 1 MG/G
CREAM TOPICAL 2 TIMES DAILY
Qty: 80 G | Refills: 1 | Status: SHIPPED | OUTPATIENT
Start: 2019-03-15 | End: 2019-10-28

## 2019-03-15 RX ORDER — LISINOPRIL 20 MG/1
20 TABLET ORAL DAILY
Qty: 90 TABLET | Refills: 1 | Status: SHIPPED | OUTPATIENT
Start: 2019-03-15 | End: 2019-03-15 | Stop reason: SDUPTHER

## 2019-03-15 RX ORDER — LISINOPRIL 20 MG/1
20 TABLET ORAL DAILY
Qty: 90 TABLET | Refills: 1 | Status: SHIPPED | OUTPATIENT
Start: 2019-03-15 | End: 2019-10-28 | Stop reason: SDUPTHER

## 2019-03-15 NOTE — PROGRESS NOTES
Problem List Items Addressed This Visit        Cardiovascular and Mediastinum    Benign essential hypertension    Relevant Medications    lisinopril (PRINIVIL,ZESTRIL) 20 MG tablet       Endocrine    PCOS (polycystic ovarian syndrome) - Primary    Overview     Afsanehlala 2/5/2018  She never gets the same OCP generic cali in a row and having menstrual irregularities and other issues she thinks is re to the constant changing of the hormone levels.          Relevant Medications    metFORMIN ER (GLUCOPHAGE XR) 500 MG 24 hr tablet      Other Visit Diagnoses     Intertrigo        Relevant Medications    nystatin-triamcinolone (MYCOLOG) 392068-2.1 UNIT/GM-% ointment    triamcinolone (KENALOG) 0.1 % cream         No Follow-up on file.  There are no Patient Instructions on file for this visit.  Windy Lam is a 28 y.o. female being seen in our office today for Hypertension                 She  reports that  has never smoked. she has never used smokeless tobacco. She reports that she drinks alcohol. She reports that she does not use drugs.             HPI She has recently started a program called [employer], began at the end of January. Down 13 lbs. Developed by psychologists. She now understands where she is getting calories from. She has been doing that since January.   Feels better.              The following portions of the patient's history were reviewed and updated as appropriate:PMHroutine: Social history , Allergies, Current Medications, Active Problem List and Health Maintenance            Review of Systems   Constitutional: Negative for activity change, appetite change, chills, fatigue, fever and unexpected weight change.   HENT: Negative for congestion, ear pain, hearing loss, nosebleeds, rhinorrhea and sore throat.    Eyes: Negative for pain, redness and visual disturbance.   Respiratory: Negative for cough, shortness of breath and wheezing.    Cardiovascular: Negative for chest pain, palpitations and leg swelling.    Gastrointestinal: Negative for abdominal pain, blood in stool, constipation, diarrhea, nausea and vomiting.   Endocrine: Negative for cold intolerance and heat intolerance.   Genitourinary: Negative for difficulty urinating, dysuria, frequency, hematuria, pelvic pain, urgency and vaginal discharge.   Musculoskeletal: Negative for arthralgias, back pain and joint swelling.   Skin: Negative for rash and wound.   Neurological: Negative for dizziness, weakness, numbness and headaches.   Hematological: Does not bruise/bleed easily.   Psychiatric/Behavioral: Negative for dysphoric mood, sleep disturbance and suicidal ideas. The patient is not nervous/anxious.                 BP Readings from Last 1 Encounters:   03/15/19 126/72     Wt Readings from Last 3 Encounters:   03/15/19 122 kg (269 lb)   12/31/18 129 kg (285 lb)   09/26/18 130 kg (285 lb 9.6 oz)   Body mass index is 39.72 kg/m².             Physical Exam   Constitutional: She is oriented to person, place, and time. Vital signs are normal. She appears well-developed and well-nourished. No distress.   HENT:   Head: Normocephalic.   Cardiovascular: Normal rate, regular rhythm and normal heart sounds.   Pulmonary/Chest: Effort normal and breath sounds normal.   Neurological: She is alert and oriented to person, place, and time. Gait normal.   Psychiatric: She has a normal mood and affect. Her behavior is normal. Judgment and thought content normal.   Vitals reviewed.

## 2019-04-08 RX ORDER — NYSTATIN 100000 U/G
OINTMENT TOPICAL
Qty: 30 G | Refills: 0 | Status: SHIPPED | OUTPATIENT
Start: 2019-04-08 | End: 2019-10-28

## 2019-05-22 ENCOUNTER — TELEPHONE (OUTPATIENT)
Dept: FAMILY MEDICINE CLINIC | Facility: CLINIC | Age: 29
End: 2019-05-22

## 2019-05-22 DIAGNOSIS — E28.2 PCOS (POLYCYSTIC OVARIAN SYNDROME): ICD-10-CM

## 2019-05-22 RX ORDER — METFORMIN HYDROCHLORIDE 500 MG/1
1000 TABLET, EXTENDED RELEASE ORAL
Qty: 180 TABLET | Refills: 1 | Status: SHIPPED | OUTPATIENT
Start: 2019-05-22 | End: 2019-10-28 | Stop reason: SDUPTHER

## 2019-05-22 NOTE — TELEPHONE ENCOUNTER
Left message for patient to return call regarding Metformin dosage.  Chart states patient is taking 1 per day and per Abdoulaye patient reported she is taking 2 per day now.

## 2019-10-28 ENCOUNTER — OFFICE VISIT (OUTPATIENT)
Dept: FAMILY MEDICINE CLINIC | Facility: CLINIC | Age: 29
End: 2019-10-28

## 2019-10-28 VITALS
BODY MASS INDEX: 38.21 KG/M2 | RESPIRATION RATE: 16 BRPM | OXYGEN SATURATION: 99 % | SYSTOLIC BLOOD PRESSURE: 138 MMHG | WEIGHT: 258 LBS | HEART RATE: 72 BPM | DIASTOLIC BLOOD PRESSURE: 78 MMHG | HEIGHT: 69 IN

## 2019-10-28 DIAGNOSIS — Z23 NEED FOR VACCINATION: Primary | ICD-10-CM

## 2019-10-28 DIAGNOSIS — E28.2 PCOS (POLYCYSTIC OVARIAN SYNDROME): ICD-10-CM

## 2019-10-28 DIAGNOSIS — I10 BENIGN ESSENTIAL HYPERTENSION: ICD-10-CM

## 2019-10-28 PROCEDURE — 90471 IMMUNIZATION ADMIN: CPT | Performed by: FAMILY MEDICINE

## 2019-10-28 PROCEDURE — 90686 IIV4 VACC NO PRSV 0.5 ML IM: CPT | Performed by: FAMILY MEDICINE

## 2019-10-28 PROCEDURE — 99213 OFFICE O/P EST LOW 20 MIN: CPT | Performed by: FAMILY MEDICINE

## 2019-10-28 RX ORDER — METFORMIN HYDROCHLORIDE 500 MG/1
1000 TABLET, EXTENDED RELEASE ORAL
Qty: 180 TABLET | Refills: 1 | Status: SHIPPED | OUTPATIENT
Start: 2019-10-28 | End: 2020-12-10 | Stop reason: SDUPTHER

## 2019-10-28 RX ORDER — LISINOPRIL 20 MG/1
20 TABLET ORAL DAILY
Qty: 90 TABLET | Refills: 1 | Status: SHIPPED | OUTPATIENT
Start: 2019-10-28 | End: 2020-03-10 | Stop reason: SDUPTHER

## 2019-10-28 RX ORDER — BUSPIRONE HYDROCHLORIDE 30 MG/1
30 TABLET ORAL 2 TIMES DAILY
Refills: 2 | COMMUNITY
Start: 2019-09-29 | End: 2020-10-13 | Stop reason: ALTCHOICE

## 2019-10-28 NOTE — PROGRESS NOTES
Patient Instructions   ASSESSMENT AND PLAN    Problem List Items Addressed This Visit        Cardiovascular and Mediastinum    Benign essential hypertension    Current Assessment & Plan     Andree 10/28/2019  BP is controlled well. She will recheck in six months. She will continue present meds.            Relevant Medications    lisinopril (PRINIVIL,ZESTRIL) 20 MG tablet       Endocrine    PCOS (polycystic ovarian syndrome)    Overview     Andree 2/5/2018  She never gets the same OCP generic cali in a row and having menstrual irregularities and other issues she thinks is re to the constant changing of the hormone levels.          Relevant Medications    metFORMIN ER (GLUCOPHAGE XR) 500 MG 24 hr tablet      Other Visit Diagnoses     Need for vaccination    -  Primary    Relevant Orders    Fluarix/Fluzone/Afluria Quad>6 Months (Completed)               Return in about 6 months (around 4/28/2020).  Patient was given instructions and counseling regarding her condition or for health maintenance advice. Please see specific information pulled into the AVS by me.          SUBJECTIVE  Windy Lam is a 29 y.o. female being seen in our office today for Hypertension               Social History  She  reports that she has never smoked. She has never used smokeless tobacco. She reports that she drinks alcohol. She reports that she does not use drugs.    History of the Present Illness   HPI Patient is here for follow-up of hypertension. She is exercising and is adherent to a low-salt diet. Patient does not check BP at home.. Patient denies chest pain and dyspnea. Cardiovascular risk factors: obesity (BMI >= 30 kg/m2) and but she has lost a lot of weight in the last year -- 30 lbs!. Use of agents associated with hypertension: none. History of target organ damage: none. She is compliant with meds.   Significant Past History  The following portions of the patient's history were reviewed and updated as appropriate:PMHroutine:  Social history , Allergies, Current Medications, Active Problem List and Health Maintenance    Review of Systems   Constitutional: Negative for activity change, appetite change, chills, fatigue, fever and unexpected weight change.   HENT: Negative for congestion, ear pain, hearing loss, nosebleeds, rhinorrhea and sore throat.    Eyes: Negative for pain, redness and visual disturbance.   Respiratory: Negative for cough, shortness of breath and wheezing.    Cardiovascular: Negative for chest pain, palpitations and leg swelling.   Gastrointestinal: Negative for abdominal pain, blood in stool, constipation, diarrhea, nausea and vomiting.   Endocrine: Negative for cold intolerance and heat intolerance.   Genitourinary: Negative for difficulty urinating, dysuria, frequency, hematuria, pelvic pain, urgency and vaginal discharge.   Musculoskeletal: Negative for arthralgias, back pain and joint swelling.   Skin: Negative for rash and wound.   Neurological: Negative for dizziness, weakness, numbness and headaches.   Hematological: Does not bruise/bleed easily.   Psychiatric/Behavioral: Negative for dysphoric mood, sleep disturbance and suicidal ideas. The patient is not nervous/anxious.    I have reviewed the ROS as documented by the MA. Leticia Benavidez MD      OBJECTIVE   Vital Signs          BP Readings from Last 1 Encounters:   10/28/19 138/78     Wt Readings from Last 3 Encounters:   10/28/19 117 kg (258 lb)   03/15/19 122 kg (269 lb)   12/31/18 129 kg (285 lb)   Body mass index is 38.1 kg/m².     Physical Exam   Constitutional: She is oriented to person, place, and time. Vital signs are normal. She appears well-developed and well-nourished. No distress.   HENT:   Head: Normocephalic.   Cardiovascular: Normal rate, regular rhythm and normal heart sounds.   Pulmonary/Chest: Effort normal and breath sounds normal.   Neurological: She is alert and oriented to person, place, and time. Gait normal.   Psychiatric: She has a  normal mood and affect. Her behavior is normal. Judgment and thought content normal.   Vitals reviewed.    Data Reviewed

## 2019-10-28 NOTE — ASSESSMENT & PLAN NOTE
Andree 10/28/2019  BP is controlled well. She will recheck in six months. She will continue present meds.

## 2019-11-15 ENCOUNTER — OFFICE VISIT (OUTPATIENT)
Dept: FAMILY MEDICINE CLINIC | Facility: CLINIC | Age: 29
End: 2019-11-15

## 2019-11-15 VITALS
WEIGHT: 257 LBS | OXYGEN SATURATION: 100 % | BODY MASS INDEX: 38.06 KG/M2 | HEART RATE: 105 BPM | SYSTOLIC BLOOD PRESSURE: 136 MMHG | HEIGHT: 69 IN | RESPIRATION RATE: 16 BRPM | DIASTOLIC BLOOD PRESSURE: 86 MMHG

## 2019-11-15 DIAGNOSIS — M77.42 METATARSALGIA, LEFT FOOT: Primary | ICD-10-CM

## 2019-11-15 PROCEDURE — 99212 OFFICE O/P EST SF 10 MIN: CPT | Performed by: FAMILY MEDICINE

## 2019-11-15 RX ORDER — MELOXICAM 15 MG/1
15 TABLET ORAL DAILY
Qty: 30 TABLET | Refills: 1 | Status: SHIPPED | OUTPATIENT
Start: 2019-11-15 | End: 2020-05-21

## 2019-11-15 NOTE — PROGRESS NOTES
Patient Instructions   ASSESSMENT AND PLAN     Problem List Items Addressed This Visit     None      Visit Diagnoses     Metatarsalgia, left foot    -  Primary    Relevant Medications    meloxicam (MOBIC) 15 MG tablet    Other Relevant Orders    Ambulatory Referral to Orthopedic Surgery               Return if symptoms worsen or fail to improve.  Patient was given instructions and counseling regarding her condition or for health maintenance advice. Please see specific information pulled into the AVS by me.          SUBJECTIVE  Windy Lam is a 29 y.o. female being seen in our office today for Foot Pain (Left)               Social History  She  reports that she has never smoked. She has never used smokeless tobacco. She reports that she drinks alcohol. She reports that she does not use drugs.    History of the Present Illness   HPI She is having a lot of foot pain with the left foot. Now getting worse. Feels it on the top of the foot. Much worse after she works out and she is limping when she goes to stand up. As she moves it gets a little better. Can't tell that any particular shoes make it worse but heels help it.   Significant Past History  The following portions of the patient's history were reviewed and updated as appropriate:PMHroutine: Social history , Allergies, Current Medications, Active Problem List and Health Maintenance    Review of Systems   Constitutional: Negative for activity change, appetite change, chills, fatigue, fever and unexpected weight change.   HENT: Negative for congestion, ear pain, hearing loss, nosebleeds, rhinorrhea and sore throat.    Eyes: Negative for pain, redness and visual disturbance.   Respiratory: Negative for cough, shortness of breath and wheezing.    Cardiovascular: Negative for chest pain, palpitations and leg swelling.   Gastrointestinal: Negative for abdominal pain, blood in stool, constipation, diarrhea, nausea and vomiting.   Endocrine: Negative for cold intolerance  and heat intolerance.   Genitourinary: Negative for difficulty urinating, dysuria, frequency, hematuria, pelvic pain, urgency and vaginal discharge.   Musculoskeletal: Negative for arthralgias, back pain and joint swelling.        Foot pain   Skin: Negative for rash and wound.   Neurological: Negative for dizziness, weakness, numbness and headaches.   Hematological: Does not bruise/bleed easily.   Psychiatric/Behavioral: Negative for dysphoric mood, sleep disturbance and suicidal ideas. The patient is not nervous/anxious.    I have reviewed the ROS as documented by the MA. Leticia Benavidez MD      OBJECTIVE   Vital Signs          BP Readings from Last 1 Encounters:   11/15/19 136/86     Wt Readings from Last 3 Encounters:   11/15/19 117 kg (257 lb)   10/28/19 117 kg (258 lb)   03/15/19 122 kg (269 lb)   Body mass index is 37.95 kg/m².     Physical Exam   Constitutional: She appears well-developed and well-nourished.   Musculoskeletal: Normal range of motion. She exhibits no edema or tenderness.   Psychiatric: She has a normal mood and affect. Her behavior is normal. Judgment and thought content normal.   Vitals reviewed.    Data Reviewed

## 2019-11-21 ENCOUNTER — OFFICE VISIT (OUTPATIENT)
Dept: ORTHOPEDIC SURGERY | Facility: CLINIC | Age: 29
End: 2019-11-21

## 2019-11-21 VITALS — BODY MASS INDEX: 37.03 KG/M2 | WEIGHT: 250 LBS | HEIGHT: 69 IN | TEMPERATURE: 98.6 F

## 2019-11-21 DIAGNOSIS — M79.672 CHRONIC PAIN IN LEFT FOOT: ICD-10-CM

## 2019-11-21 DIAGNOSIS — E66.01 MORBID OBESITY WITH BMI OF 40.0-44.9, ADULT (HCC): ICD-10-CM

## 2019-11-21 DIAGNOSIS — M79.672 FOOT PAIN, LEFT: Primary | ICD-10-CM

## 2019-11-21 DIAGNOSIS — G89.29 CHRONIC PAIN IN LEFT FOOT: ICD-10-CM

## 2019-11-21 PROCEDURE — 73630 X-RAY EXAM OF FOOT: CPT | Performed by: NURSE PRACTITIONER

## 2019-11-21 PROCEDURE — 99213 OFFICE O/P EST LOW 20 MIN: CPT | Performed by: NURSE PRACTITIONER

## 2019-11-21 NOTE — PROGRESS NOTES
Patient Name: Windy Lam   YOB: 1990  Referring Primary Care Physician: Leticia Benavidez MD  BMI: Body mass index is 37.46 kg/m².    Chief Complaint:    Chief Complaint   Patient presents with   • Left Foot - Establish Care        HPI: New pt to me started  Interval training 3 months ago and has had midfoot pain on and off that is been persistent and kept her from doing her exercise.  History of previous fracture or injury to the foot in the past.  Pt is losing weight and working out and eating well and has lost 50 pounds in a year   LMP CURRENT   Windy Lam is a 29 y.o. female who presents today for evaluation of   Chief Complaint   Patient presents with   • Left Foot - Establish Care       This problem is new to this examiner.     Subjective   Medications:   Home Medications:  Current Outpatient Medications on File Prior to Visit   Medication Sig   • busPIRone (BUSPAR) 30 MG tablet Take 30 mg by mouth 2 (Two) Times a Day.   • fluocinolone acetonide (DERMOTIC) 0.01 % oil otic oil PLACE 5 DROPS IN RIGHT EAR BID   • ibuprofen (ADVIL,MOTRIN) 200 MG tablet Take 200 mg by mouth every 6 (six) hours as needed for mild pain (1-3).   • lisinopril (PRINIVIL,ZESTRIL) 20 MG tablet Take 1 tablet by mouth Daily for 180 days.   • metFORMIN ER (GLUCOPHAGE XR) 500 MG 24 hr tablet Take 2 tablets by mouth Daily With Breakfast.   • Multiple Vitamins-Minerals (MULTIVITAMIN WITH MINERALS) tablet tablet Take 1 tablet by mouth Daily.   • meloxicam (MOBIC) 15 MG tablet Take 1 tablet by mouth Daily.     No current facility-administered medications on file prior to visit.      Current Medications:  Scheduled Meds:  Continuous Infusions:  No current facility-administered medications for this visit.   PRN Meds:.    I have reviewed the patient's medical history in detail and updated the computerized patient record.  Review and summarization of old records includes:    Past Medical History:   Diagnosis Date   • Anemia    •  Anxiety    • Asthma    • Menstrual problem    • PCOS (polycystic ovarian syndrome)       History reviewed. No pertinent surgical history.     Social History     Occupational History   • Not on file   Tobacco Use   • Smoking status: Never Smoker   • Smokeless tobacco: Never Used   Substance and Sexual Activity   • Alcohol use: Yes     Comment: social   • Drug use: No   • Sexual activity: Yes     Partners: Male     Birth control/protection: OCP     Comment: bcp      Social History     Social History Narrative   • Not on file        Family History   Problem Relation Age of Onset   • Heart disease Mother    • Thyroid disease Mother    • Hypertension Mother    • Heart disease Father    • Hypertension Father    • Diabetes Maternal Grandmother    • Breast cancer Maternal Grandmother    • Melanoma Maternal Grandmother    • Stroke Maternal Grandmother    • Melanoma Maternal Grandfather        ROS: 14 point review of systems was performed and all other systems were reviewed and are negative except for documented findings in HPI and today's encounter.     Allergies:   Allergies   Allergen Reactions   • Sertraline Palpitations     She had a really fast heart rate after a couple of doses and didn't tolerate a 1/2 dose.   She had a really fast heart rate after a couple of doses and didn't tolerate a 1/2 dose.      Constitutional:  Denies fever, shaking or chills   Eyes:  Denies change in visual acuity   HENT:  Denies nasal congestion or sore throat   Respiratory:  Denies cough or shortness of breath   Cardiovascular:  Denies chest pain or severe LE edema   GI:  Denies abdominal pain, nausea, vomiting, bloody stools or diarrhea   Musculoskeletal:  Numbness, tingling, pain, or loss of motor function only as noted above in history of present illness.  : Denies painful urination or hematuria  Integument:  Denies rash, lesion or ulceration   Neurologic:  Denies headache or focal weakness  Endocrine:  Denies lymphadenopathy  Psych:   "Denies confusion or change in mental status   Hem:  Denies active bleeding    OBJECTIVE:  Physical Exam:   Temp 98.6 °F (37 °C)   Ht 174 cm (68.5\")   Wt 113 kg (250 lb)   BMI 37.46 kg/m²     General Appearance:    Alert, cooperative, in no acute distress                  Eyes: conjunctiva clear  ENT: external ears and nose atraumatic  CV: no peripheral edema  Resp: normal respiratory effort  Skin: no rashes or wounds; normal turgor  Psych: mood and affect appropriate  Lymph: no nodes appreciated  Neuro: gross sensation intact  Vascular:  Palpable peripheral pulse in noted extremity  Musculoskeletal Extremities: Diffuse tenderness to the midfoot Achilles is intact calf is soft nontender knee is nontender base of fifth metatarsal is nontender  Skin is warm dry and intact good pulses movement and sensation  Radiology: 3 views of the foot were done that reveal no acute fracture no readily available views for comparison    Assessment:     ICD-10-CM ICD-9-CM   1. Foot pain, left M79.672 729.5   2. Morbid obesity with BMI of 40.0-44.9, adult (CMS/[facility]) E66.01 278.01    Z68.41 V85.41   3. Chronic pain in left foot M79.672 729.5    G89.29 338.29        Procedures  Place her in a postop shoe to stop flexion of the foot get an MRI to look for a stress fracture or ligamental injury tendon problem discussed weight loss which she is continuing and will follow-up thank you    Plan: Biomechanics of pertinent body area discussed.  Risks, benefits, alternatives, comparisons, and complications of accepted medicines, injections, recommendations, surgical procedures, and therapies explained and education provided in laymen's terms. Natural history and expected course of this patient's diagnosis discussed along with evaluation of therapies. Questions answered. When appropriate I also discussed proper use of cane, walker, trekking poles.   BMI:  The concept of BMI body mass index and its importance and implications discussed.  BMI " suggested to be < 40 or as low as possible. Lifestyle measures for weight loss and how this affects orthopedic condition.  EXERCISES:  Advice on benefits of, and types of regular/moderate exercise including biomechanical forces involved as it pertains to this complaint.  MEDICATIONS:  Prescription, OTC and Monitoring of Medications per orders to address ortho complaints; Evaluation and discussion of safety, precautions, side effects, and warnings given especially of long term NSAID or steroid therapy.    RICE: Rest, ice, compression, and elevation therapy, Cryotherapy/brachy therapy, and or OTC linaments as indicated with instructions.   Discussion of knee braces/elastic support sleeve.      11/21/2019    Much of this encounter note is an electronic transcription/translation of spoken language to printed text. The electronic translation of spoken language may permit erroneous, or at times, nonsensical words or phrases to be inadvertently transcribed; Although I have reviewed the note for such errors, some may still exist

## 2019-12-06 ENCOUNTER — HOSPITAL ENCOUNTER (OUTPATIENT)
Dept: MRI IMAGING | Facility: HOSPITAL | Age: 29
Discharge: HOME | End: 2019-12-06
Admitting: NURSE PRACTITIONER

## 2019-12-06 DIAGNOSIS — M79.672 CHRONIC PAIN IN LEFT FOOT: ICD-10-CM

## 2019-12-06 DIAGNOSIS — G89.29 CHRONIC PAIN IN LEFT FOOT: ICD-10-CM

## 2019-12-06 DIAGNOSIS — M79.672 FOOT PAIN, LEFT: ICD-10-CM

## 2019-12-06 PROCEDURE — 73718 MRI LOWER EXTREMITY W/O DYE: CPT

## 2019-12-10 ENCOUNTER — OFFICE VISIT (OUTPATIENT)
Dept: FAMILY MEDICINE CLINIC | Facility: CLINIC | Age: 29
End: 2019-12-10

## 2019-12-10 VITALS
DIASTOLIC BLOOD PRESSURE: 100 MMHG | BODY MASS INDEX: 40.01 KG/M2 | OXYGEN SATURATION: 99 % | HEART RATE: 85 BPM | HEIGHT: 68 IN | WEIGHT: 264 LBS | SYSTOLIC BLOOD PRESSURE: 148 MMHG | TEMPERATURE: 97.5 F

## 2019-12-10 DIAGNOSIS — J40 BRONCHITIS: ICD-10-CM

## 2019-12-10 DIAGNOSIS — R06.2 WHEEZING: Primary | ICD-10-CM

## 2019-12-10 PROCEDURE — 99214 OFFICE O/P EST MOD 30 MIN: CPT | Performed by: NURSE PRACTITIONER

## 2019-12-10 RX ORDER — METHYLPREDNISOLONE 4 MG/1
TABLET ORAL
Qty: 1 EACH | Refills: 0 | Status: SHIPPED | OUTPATIENT
Start: 2019-12-10 | End: 2020-05-21

## 2019-12-10 RX ORDER — AMOXICILLIN 875 MG/1
875 TABLET, COATED ORAL 2 TIMES DAILY
Qty: 20 TABLET | Refills: 0 | Status: SHIPPED | OUTPATIENT
Start: 2019-12-10 | End: 2019-12-20

## 2019-12-10 RX ORDER — ALBUTEROL SULFATE 90 UG/1
2 AEROSOL, METERED RESPIRATORY (INHALATION) EVERY 4 HOURS PRN
Qty: 1 INHALER | Refills: 1 | Status: SHIPPED | OUTPATIENT
Start: 2019-12-10 | End: 2021-09-13

## 2019-12-10 NOTE — PROGRESS NOTES
Subjective fever, sinus pain and pressure and cough  Windy Gaston is a 29 y.o. female.     History of Present Illness   Started with a sorethroat a week ago and then the symptoms worsened.Have been taking sinus max which helped some. She feels like she is short of breath.    The following portions of the patient's history were reviewed and updated as appropriate: allergies, current medications, past family history, past medical history, past social history, past surgical history and problem list.    Review of Systems   Constitutional: Negative for activity change, appetite change, chills and fever.   HENT: Positive for ear pain and sinus pressure. Negative for congestion.    Eyes: Negative for pain.   Respiratory: Positive for cough, shortness of breath and wheezing.    Cardiovascular: Negative for chest pain and leg swelling.   Gastrointestinal: Negative for nausea and vomiting.   Genitourinary: Negative for difficulty urinating.   Skin: Negative for rash.   Neurological: Negative for dizziness, facial asymmetry and headache.       Objective   Physical Exam   Constitutional: She is oriented to person, place, and time. She appears well-developed and well-nourished.   HENT:   Head: Normocephalic and atraumatic.   Right Ear: External ear normal.   Left Ear: External ear normal.   Mouth/Throat: Oropharynx is clear and moist.   Eyes: Pupils are equal, round, and reactive to light. Conjunctivae and EOM are normal.   Neck: Neck supple.   Cardiovascular: Normal rate and regular rhythm.   Pulmonary/Chest: Effort normal. No respiratory distress. She has wheezes.   Abdominal: Bowel sounds are normal.   Musculoskeletal: Normal range of motion.   Lymphadenopathy:     She has no cervical adenopathy.   Neurological: She is alert and oriented to person, place, and time.   Skin: Skin is warm and dry.   Psychiatric: She has a normal mood and affect.   Nursing note and vitals reviewed.        Assessment/Plan   Windy was seen today  for cough.    Diagnoses and all orders for this visit:    Wheezing    Bronchitis    Other orders  -     amoxicillin (AMOXIL) 875 MG tablet; Take 1 tablet by mouth 2 (Two) Times a Day for 10 days.  -     methylPREDNISolone (MEDROL, ELLEN,) 4 MG tablet; Take as directed on package instructions.  -     albuterol sulfate  (90 Base) MCG/ACT inhaler; Inhale 2 puffs Every 4 (Four) Hours As Needed for Wheezing.      S/p nebulizer in office no wheezes and moving good air.

## 2019-12-11 NOTE — PATIENT INSTRUCTIONS
Bronchospasm, Adult    Bronchospasm is a tightening of the airways going into the lungs. During an episode, it may be harder to breathe. You may cough, and you may make a whistling sound when you breathe (wheeze).  This condition often affects people with asthma.  What are the causes?  This condition is caused by swelling and irritation in the airways. It can be triggered by:  · An infection (common).  · Seasonal allergies.  · An allergic reaction.  · Exercise.  · Irritants. These include pollution, cigarette smoke, strong odors, aerosol sprays, and paint fumes.  · Weather changes. Winds increase molds and pollens in the air. Cold air may cause swelling.  · Stress and emotional upset.  What are the signs or symptoms?  Symptoms of this condition include:  · Wheezing. If the episode was triggered by an allergy, wheezing may start right away or hours later.  · Nighttime coughing.  · Frequent or severe coughing with a simple cold.  · Chest tightness.  · Shortness of breath.  · Decreased ability to exercise.  How is this diagnosed?  This condition is usually diagnosed with a review of your medical history and a physical exam. Tests, such as lung function tests, are sometimes done to look for other conditions. The need for a chest X-ray depends on where the wheezing occurs and whether it is the first time you have wheezed.  How is this treated?  This condition may be treated with:  · Inhaled medicines. These open up the airways and help you breathe. They can be taken with an inhaler or a nebulizer device.  · Corticosteroid medicines. These may be given for severe bronchospasm, usually when it is associated with asthma.  · Avoiding triggers, such as irritants, infection, or allergies.  Follow these instructions at home:  Medicines  · Take over-the-counter and prescription medicines only as told by your health care provider.  · If you need to use an inhaler or nebulizer to take your medicine, ask your health care provider  to explain how to use it correctly. If you were given a spacer, always use it with your inhaler.  Lifestyle  · Reduce the number of triggers in your home. To do this:  ? Change your heating and air conditioning filter at least once a month.  ? Limit your use of fireplaces and wood stoves.  ? Do not smoke. Do not allow smoking in your home.  ? Avoid using perfumes and fragrances.  ? Get rid of pests, such as roaches and mice, and their droppings.  ? Remove any mold from your home.  ? Keep your house clean and dust free. Use unscented cleaning products.  ? Replace carpet with wood, tile, or vinyl whitney. Carpet can trap dander and dust.  ? Use allergy-proof pillows, mattress covers, and box spring covers.  ? Wash bed sheets and blankets every week in hot water. Dry them in a dryer.  ? Use blankets that are made of polyester or cotton.  ? Wash your hands often.  ? Do not allow pets in your bedroom.  · Avoid breathing in cold air when you exercise.  General instructions  · Have a plan for seeking medical care. Know when to call your health care provider and local emergency services, and where to get emergency care.  · Stay up to date on your immunizations.  · When you have an episode of bronchospasm, stay calm. Try to relax and breathe more slowly.  · If you have asthma, make sure you have an asthma action plan.  · Keep all follow-up visits as told by your health care provider. This is important.  Contact a health care provider if:  · You have muscle aches.  · You have chest pain.  · The mucus that you cough up (sputum) changes from clear or white to yellow, green, gray, or bloody.  · You have a fever.  · Your sputum gets thicker.  Get help right away if:  · Your wheezing and coughing get worse, even after you take your prescribed medicines.  · It gets even harder to breathe.  · You develop severe chest pain.  Summary  · Bronchospasm is a tightening of the airways going into the lungs.  · During an episode of  bronchospasm, you may have a harder time breathing. You may cough and make a whistling sound when you breathe (wheeze).  · Avoid exposure to triggers such as smoke, dust, mold, animal dander, and fragrances.  · When you have an episode of bronchospasm, stay calm. Try to relax and breathe more slowly.  This information is not intended to replace advice given to you by your health care provider. Make sure you discuss any questions you have with your health care provider.  Document Released: 12/20/2004 Document Revised: 12/14/2017 Document Reviewed: 12/14/2017  [employer] Interactive Patient Education © 2019 [employer]

## 2019-12-13 ENCOUNTER — TELEPHONE (OUTPATIENT)
Dept: ORTHOPEDIC SURGERY | Facility: CLINIC | Age: 29
End: 2019-12-13

## 2020-03-10 DIAGNOSIS — I10 BENIGN ESSENTIAL HYPERTENSION: ICD-10-CM

## 2020-03-10 NOTE — TELEPHONE ENCOUNTER
Pt called and requested refill for lisinopril (PRINIVIL,ZESTRIL) 20 MG tablet be sent to [facility]    Pt call back   [phone]

## 2020-03-11 RX ORDER — LISINOPRIL 20 MG/1
20 TABLET ORAL DAILY
Qty: 90 TABLET | Refills: 1 | Status: SHIPPED | OUTPATIENT
Start: 2020-03-11 | End: 2020-04-27 | Stop reason: ALTCHOICE

## 2020-04-27 ENCOUNTER — TELEMEDICINE (OUTPATIENT)
Dept: FAMILY MEDICINE CLINIC | Facility: CLINIC | Age: 30
End: 2020-04-27

## 2020-04-27 DIAGNOSIS — I10 ESSENTIAL HYPERTENSION: Primary | ICD-10-CM

## 2020-04-27 PROCEDURE — 99213 OFFICE O/P EST LOW 20 MIN: CPT | Performed by: NURSE PRACTITIONER

## 2020-04-27 RX ORDER — LISINOPRIL AND HYDROCHLOROTHIAZIDE 20; 12.5 MG/1; MG/1
1 TABLET ORAL DAILY
Qty: 30 TABLET | Refills: 1 | Status: SHIPPED | OUTPATIENT
Start: 2020-04-27 | End: 2020-07-13 | Stop reason: SDUPTHER

## 2020-04-27 NOTE — PROGRESS NOTES
Subjective hypertension  Windy Gaston is a 29 y.o. female.     History of Present Illness   Hypertension and has been taking 20 mgs of lisinopril for a while but her systolic numbers have been in the mid 130's and she feels like it is not being managed. She denies any chest pain, shortness of breath or dizziness.  The following portions of the patient's history were reviewed and updated as appropriate: allergies, current medications, past family history, past medical history, past social history, past surgical history and problem list.    Review of Systems   Constitutional: Negative for activity change, appetite change and fever.   Respiratory: Negative for cough and shortness of breath.    Cardiovascular: Negative for chest pain and leg swelling.   Skin: Negative for rash.       Objective   Physical Exam   Constitutional: She is oriented to person, place, and time. She appears well-developed and well-nourished.   HENT:   Head: Normocephalic and atraumatic.   Eyes: Pupils are equal, round, and reactive to light. EOM are normal.   Pulmonary/Chest: Effort normal.   Musculoskeletal: Normal range of motion.   Neurological: She is alert and oriented to person, place, and time.   Skin: Skin is warm and dry.   Psychiatric: She has a normal mood and affect.   Nursing note and vitals reviewed.        Assessment/Plan   Windy was seen today for hypertension.    Diagnoses and all orders for this visit:    Essential hypertension    Other orders  -     lisinopril-hydrochlorothiazide (PRINZIDE,ZESTORETIC) 20-12.5 MG per tablet; Take 1 tablet by mouth Daily.      The use of a video visit has been reviewed with the patient and verbal informed consent has been obtained.  15 minutes was spent with the patient on a video visit discussing her symptoms, her current medications the change in medication and affording plan.

## 2020-05-04 ENCOUNTER — TELEPHONE (OUTPATIENT)
Dept: FAMILY MEDICINE CLINIC | Facility: CLINIC | Age: 30
End: 2020-05-04

## 2020-05-04 NOTE — TELEPHONE ENCOUNTER
I sent it in to the pharmacy that is in her chart. I tried calling her number but was unable to get through.

## 2020-05-04 NOTE — TELEPHONE ENCOUNTER
PT CALLED STATING THE PHARMACY DOES NOT HAVE THE ANTIBIOTIC THAT WAS SUPPOSE TO BE CALLED N FOR HER.     G[facility]INO CONFIRMED     PT CALL BACK   [phone]

## 2020-05-07 ENCOUNTER — TELEPHONE (OUTPATIENT)
Dept: FAMILY MEDICINE CLINIC | Facility: CLINIC | Age: 30
End: 2020-05-07

## 2020-05-07 NOTE — TELEPHONE ENCOUNTER
PT CALLING IN TO REPORT AN UPDATE FROM HER VIDEO VISIT ON 5/4.  THE BUMP ON HER ARM DOES NOT HURT ANYMORE BUT IS NOT GOING DOWN.    PLEASE CALL AND ADVISE -[phone]

## 2020-05-07 NOTE — TELEPHONE ENCOUNTER
Spoke to pt and she is better, told her it may take more time and to continue the heat. Let me know if it worsens

## 2020-05-12 ENCOUNTER — TELEMEDICINE (OUTPATIENT)
Dept: FAMILY MEDICINE CLINIC | Facility: CLINIC | Age: 30
End: 2020-05-12

## 2020-05-12 VITALS — TEMPERATURE: 99.6 F

## 2020-05-12 DIAGNOSIS — R50.9 FEVER, UNSPECIFIED FEVER CAUSE: Primary | ICD-10-CM

## 2020-05-12 PROCEDURE — 99213 OFFICE O/P EST LOW 20 MIN: CPT | Performed by: NURSE PRACTITIONER

## 2020-05-12 NOTE — PATIENT INSTRUCTIONS
Fever, Adult         A fever is an increase in the body's temperature. It is usually defined as a temperature of 100.4°F (38°C) or higher. Brief mild or moderate fevers generally have no long-term effects, and they often do not need treatment. Moderate or high fevers may make you feel uncomfortable and can sometimes be a sign of a serious illness or disease. The sweating that may occur with repeated or prolonged fever may also cause a loss of fluid in the body (dehydration).  Fever is confirmed by taking a temperature with a thermometer. A measured temperature can vary with:  · Age.  · Time of day.  · Where in the body you take the temperature. Readings may vary if you place the thermometer:  ? In the mouth (oral).  ? In the rectum (rectal).  ? In the ear (tympanic).  ? Under the arm (axillary).  ? On the forehead (temporal).  Follow these instructions at home:  Medicines  · Take over-the counter and prescription medicines only as told by your health care provider. Follow the dosing instructions carefully.  · If you were prescribed an antibiotic medicine, take it as told by your health care provider. Do not stop taking the antibiotic even if you start to feel better.  General instructions  · Watch your condition for any changes. Let your health care provider know about them.  · Rest as needed.  · Drink enough fluid to keep your urine pale yellow. This helps to prevent dehydration.  · Sponge yourself or bathe with room-temperature water to help reduce your body temperature as needed. Do not use ice water.  · Do not use too many blankets or wear clothes that are too heavy.  · If your fever may be caused by an infection that spreads from person to person (is contagious), such as a cold or the flu, you should stay home from work and public gatherings for at least 24 hours after your fever is gone. Your fever should be gone without the need to use medicines.  Contact a health care provider if:  · You vomit.  · You cannot  eat or drink without vomiting.  · You have diarrhea.  · You have pain when you urinate.  · Your symptoms do not improve with treatment.  · You develop new symptoms.  · You develop excessive weakness.  Get help right away if:  · You have shortness of breath or have trouble breathing.  · You are dizzy or you faint.  · You are disoriented or confused.  · You develop signs of dehydration, such as:  ? Dark urine, very little urine, or no urine.  ? Cracked lips.  ? Dry mouth.  ? Sunken eyes.  ? Sleepiness.  ? Weakness.  · You develop severe pain in your abdomen.  · You have persistent vomiting or diarrhea.  · You develop a skin rash.  · Your symptoms suddenly get worse.  Summary  · A fever is an increase in the body's temperature. It is usually defined as a temperature of 100.4°F (38°C) or higher. Moderate or high fevers can sometimes be a sign of a serious illness or disease. The sweating that may occur with repeated or prolonged fever may also cause dehydration.  · Pay attention to any changes in your symptoms and contact your health care provider if your symptoms do not improve with treatment.  · Take over-the counter and prescription medicines only as told by your health care provider. Follow the dosing instructions carefully.  · If your fever is from an infection that may be contagious, such as cold or flu, you should stay home from work and public gatherings for at least 24 hours after your fever is gone. Your fever should be gone without the need to use medicines.  · Get help right away if you develop signs of dehydration, such as dark urine, cracked lips, dry mouth, sunken eyes, sleepiness, or weakness.  This information is not intended to replace advice given to you by your health care provider. Make sure you discuss any questions you have with your health care provider.  Document Released: 06/13/2002 Document Revised: 06/03/2019 Document Reviewed: 06/03/2019  [employer] Interactive Patient Education © 2020  [employer]

## 2020-05-12 NOTE — PROGRESS NOTES
Subjective Headache, rash, neck pain and fever.  Windy Gaston is a 29 y.o. female.     History of Present Illness   This is a 29-year-old female who has the acute complaints of headache, rash, neck pain and fever.  She reports she has felt poorly for 2 days, her left ear started to hurt last night.  She has taken some Tylenol and ibuprofen with minimal improvement.  She developed a rash a couple days ago that is body wide and pruritic.  She has had no new exposures to any soaps or perfumes or laundry detergent.  She has had no known exposures and has been working from home.  The following portions of the patient's history were reviewed and updated as appropriate: allergies, current medications, past family history, past medical history, past social history, past surgical history and problem list.    Review of Systems   Constitutional: Positive for activity change, chills, fatigue and fever.   HENT: Positive for ear pain.    Eyes: Negative for blurred vision.   Respiratory: Negative for cough and shortness of breath.    Cardiovascular: Negative for chest pain.   Gastrointestinal: Negative for abdominal pain.   Musculoskeletal: Positive for neck pain.   Skin: Positive for rash.       Objective   Physical Exam   Constitutional: She is oriented to person, place, and time. She appears well-developed and well-nourished.   HENT:   Head: Normocephalic and atraumatic.   Eyes: Pupils are equal, round, and reactive to light. EOM are normal.   Pulmonary/Chest: Effort normal.   Musculoskeletal: Normal range of motion.   Neurological: She is alert and oriented to person, place, and time.   Skin: Skin is warm and dry. Rash noted.   She has a contact dermatitis type rash, it is macular and papular in nature.  It is body wide.   Psychiatric: She has a normal mood and affect.   Nursing note and vitals reviewed.        Assessment/Plan   There are no diagnoses linked to this encounter.  Fever  Continue Tylenol or ibuprofen for fever  managemen    Viral exanthem  May take Benadryl for the itching.    This was a very difficult appointment secondary to all her complaints.  She was advised to go to the urgent care in [place] to be checked.  She agreed to the plan.    I spent 15 minutes via video with this patient discussing her current complaints, and plan of care.  The use of a video visit has been reviewed with the patient and verbal informed consent has been obtained.

## 2020-05-21 ENCOUNTER — TELEPHONE (OUTPATIENT)
Dept: FAMILY MEDICINE CLINIC | Facility: CLINIC | Age: 30
End: 2020-05-21

## 2020-05-21 ENCOUNTER — OFFICE VISIT (OUTPATIENT)
Dept: FAMILY MEDICINE CLINIC | Facility: CLINIC | Age: 30
End: 2020-05-21

## 2020-05-21 VITALS
OXYGEN SATURATION: 100 % | HEIGHT: 69 IN | RESPIRATION RATE: 16 BRPM | SYSTOLIC BLOOD PRESSURE: 138 MMHG | BODY MASS INDEX: 39.1 KG/M2 | HEART RATE: 105 BPM | DIASTOLIC BLOOD PRESSURE: 72 MMHG | TEMPERATURE: 97.9 F | WEIGHT: 264 LBS

## 2020-05-21 DIAGNOSIS — H65.02 NON-RECURRENT ACUTE SEROUS OTITIS MEDIA OF LEFT EAR: Primary | ICD-10-CM

## 2020-05-21 PROCEDURE — 99213 OFFICE O/P EST LOW 20 MIN: CPT | Performed by: NURSE PRACTITIONER

## 2020-05-21 RX ORDER — NORGESTREL AND ETHINYL ESTRADIOL 0.3-0.03MG
1 KIT ORAL DAILY
COMMUNITY
Start: 2020-03-31 | End: 2020-10-13 | Stop reason: ALTCHOICE

## 2020-05-21 NOTE — PATIENT INSTRUCTIONS

## 2020-05-21 NOTE — PROGRESS NOTES
Subjective  Windy Gaston is a 29 y.o. female.   Earache  Patient here for earache in left ear she just finished a round of antibiotics on Monday.  Patient reports that she was seen in urgent care and given 7 days of an antibiotic.  Her ear still hurts but it is much better.  History of Present Illness   See above the following portions of the patient's history were reviewed and updated as appropriate: allergies, current medications, past family history, past medical history, past social history, past surgical history and problem list.    Review of Systems   HENT: Positive for ear pain. Negative for ear discharge and hearing loss.    Respiratory: Negative for cough.    Gastrointestinal: Negative for abdominal pain, diarrhea and vomiting.   Musculoskeletal: Negative for neck pain.   Skin: Negative for rash.       Objective   Physical Exam   Constitutional: She is oriented to person, place, and time. She appears well-developed and well-nourished.   HENT:   Head: Normocephalic and atraumatic.   Right Ear: External ear normal.   Mouth/Throat: Oropharynx is clear and moist.   Slight erythema to drum.  Landmarks visible no bulging.   Eyes: Pupils are equal, round, and reactive to light. Conjunctivae and EOM are normal.   Neck: Neck supple.   Cardiovascular: Normal rate and regular rhythm.   Pulmonary/Chest: Effort normal and breath sounds normal. No respiratory distress.   Abdominal: Bowel sounds are normal.   Musculoskeletal: Normal range of motion.   Lymphadenopathy:     She has no cervical adenopathy.   Neurological: She is alert and oriented to person, place, and time.   Skin: Skin is warm and dry.   Psychiatric: She has a normal mood and affect.   Nursing note and vitals reviewed.        Assessment/Plan   Problem List Items Addressed This Visit     None      Visit Diagnoses     Non-recurrent acute serous otitis media of left ear    -  Primary        Advised her to give a little more time her ear looks good it is  recovering.  She is to call us back if symptoms worsen       Return if symptoms worsen or fail to improve. Answers for HPI/ROS submitted by the patient on 5/21/2020   Ear pain  What is the primary reason for your visit?: Ear Pain

## 2020-07-13 ENCOUNTER — TELEMEDICINE (OUTPATIENT)
Dept: FAMILY MEDICINE CLINIC | Facility: CLINIC | Age: 30
End: 2020-07-13

## 2020-07-13 VITALS — BODY MASS INDEX: 39.13 KG/M2 | WEIGHT: 265 LBS

## 2020-07-13 DIAGNOSIS — I10 BENIGN ESSENTIAL HYPERTENSION: Primary | ICD-10-CM

## 2020-07-13 PROCEDURE — 99213 OFFICE O/P EST LOW 20 MIN: CPT | Performed by: NURSE PRACTITIONER

## 2020-07-13 RX ORDER — LISINOPRIL AND HYDROCHLOROTHIAZIDE 20; 12.5 MG/1; MG/1
1 TABLET ORAL DAILY
Qty: 90 TABLET | Refills: 0 | Status: SHIPPED | OUTPATIENT
Start: 2020-07-13 | End: 2020-10-05 | Stop reason: SDUPTHER

## 2020-07-13 NOTE — PROGRESS NOTES
Subjective hypertension  Windy Gaston is a 30 y.o. female.     History of Present Illness -  Patient has history of hypertension and has been on lisinopril for several years.  She was having some issues where she did not think it was controlling her blood pressure, so we changed her to a lisinopril/HCTZ pill.  She reports her daily numbers are much better.  She also reports that she feels better now also.  She denies any chest pain headache or shortness of breath.  The following portions of the patient's history were reviewed and updated as appropriate: allergies, current medications, past family history, past medical history, past social history, past surgical history and problem list.    Review of Systems   Constitutional: Negative for activity change, appetite change, chills and fever.   HENT: Negative for congestion.    Eyes: Negative for pain.   Respiratory: Negative for cough and shortness of breath.    Cardiovascular: Negative for chest pain and leg swelling.   Gastrointestinal: Negative for nausea and vomiting.   Genitourinary: Negative for difficulty urinating.   Skin: Negative for rash.   Neurological: Negative for dizziness, facial asymmetry and headache.       Objective   Physical Exam   Constitutional: She is oriented to person, place, and time. She appears well-developed and well-nourished.   HENT:   Head: Normocephalic and atraumatic.   Eyes: Pupils are equal, round, and reactive to light. EOM are normal.   Pulmonary/Chest: Effort normal.   Musculoskeletal: Normal range of motion.   Neurological: She is alert and oriented to person, place, and time.   Skin: Skin is warm and dry.   Psychiatric: She has a normal mood and affect.   Nursing note and vitals reviewed.        Assessment/Plan   Windy was seen today for hypertension.    Diagnoses and all orders for this visit:    Benign essential hypertension    Other orders  -     lisinopril-hydrochlorothiazide (PRINZIDE,ZESTORETIC) 20-12.5 MG per tablet;  Take 1 tablet by mouth Daily.    It looks as though she has not had a physical exam for over 2 years.  We discussed this and she is going to make a follow-up visit.

## 2020-10-01 ENCOUNTER — APPOINTMENT (OUTPATIENT)
Dept: GENERAL RADIOLOGY | Facility: HOSPITAL | Age: 30
End: 2020-10-01

## 2020-10-01 ENCOUNTER — HOSPITAL ENCOUNTER (EMERGENCY)
Facility: HOSPITAL | Age: 30
Discharge: HOME | End: 2020-10-01
Attending: EMERGENCY MEDICINE | Admitting: EMERGENCY MEDICINE

## 2020-10-01 VITALS
RESPIRATION RATE: 18 BRPM | OXYGEN SATURATION: 100 % | TEMPERATURE: 98.7 F | SYSTOLIC BLOOD PRESSURE: 142 MMHG | DIASTOLIC BLOOD PRESSURE: 92 MMHG | HEART RATE: 110 BPM
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
## 2020-10-01 DIAGNOSIS — S80.02XA CONTUSION OF LEFT KNEE, INITIAL ENCOUNTER: Primary | ICD-10-CM

## 2020-10-01 PROCEDURE — 99283 EMERGENCY DEPT VISIT LOW MDM: CPT

## 2020-10-01 PROCEDURE — 73560 X-RAY EXAM OF KNEE 1 OR 2: CPT

## 2020-10-01 RX ORDER — IBUPROFEN 800 MG/1
800 TABLET ORAL ONCE
Status: COMPLETED | OUTPATIENT
Start: 2020-10-01 | End: 2020-10-01

## 2020-10-01 RX ADMIN — IBUPROFEN 800 MG: 800 TABLET ORAL at 16:58
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
## 2020-10-05 RX ORDER — LISINOPRIL AND HYDROCHLOROTHIAZIDE 20; 12.5 MG/1; MG/1
1 TABLET ORAL DAILY
Qty: 30 TABLET | Refills: 0 | Status: SHIPPED | OUTPATIENT
Start: 2020-10-05 | End: 2020-11-11 | Stop reason: SDUPTHER

## 2020-10-05 NOTE — TELEPHONE ENCOUNTER
Caller: Windy Gaston    Relationship: Self    Best call back number: [phone]*    Medication needed:   Requested Prescriptions     Pending Prescriptions Disp Refills   • lisinopril-hydrochlorothiazide (PRINZIDE,ZESTORETIC) 20-12.5 MG per tablet 90 tablet 0     Sig: Take 1 tablet by mouth Daily.       When do you need the refill by: ASAP    What details did the patient provide when requesting the medication: PATIENT HAS ONLY 3 DAYS OF MEDICATION LEFT UNTIL HER APPT ON 10/12.    Does the patient have less than a 3 day supply:  [] Yes  [x] No    What is the patient's preferred pharmacy: [id number] - [place], [place] - [address] [place] [address] [facility] [address] [address]  - [phone] [facility] [phone] FX

## 2020-10-13 ENCOUNTER — OFFICE VISIT (OUTPATIENT)
Dept: FAMILY MEDICINE CLINIC | Facility: CLINIC | Age: 30
End: 2020-10-13

## 2020-10-13 VITALS
WEIGHT: 268 LBS | DIASTOLIC BLOOD PRESSURE: 78 MMHG | OXYGEN SATURATION: 98 % | HEART RATE: 102 BPM | RESPIRATION RATE: 16 BRPM | TEMPERATURE: 97.5 F | SYSTOLIC BLOOD PRESSURE: 142 MMHG | BODY MASS INDEX: 39.58 KG/M2

## 2020-10-13 DIAGNOSIS — F41.9 ANXIETY: ICD-10-CM

## 2020-10-13 DIAGNOSIS — Z23 NEED FOR INFLUENZA VACCINATION: ICD-10-CM

## 2020-10-13 DIAGNOSIS — E28.2 PCOS (POLYCYSTIC OVARIAN SYNDROME): ICD-10-CM

## 2020-10-13 DIAGNOSIS — I10 BENIGN ESSENTIAL HYPERTENSION: ICD-10-CM

## 2020-10-13 DIAGNOSIS — Z00.00 PHYSICAL EXAM: Primary | ICD-10-CM

## 2020-10-13 PROCEDURE — 99212 OFFICE O/P EST SF 10 MIN: CPT | Performed by: NURSE PRACTITIONER

## 2020-10-13 PROCEDURE — 99395 PREV VISIT EST AGE 18-39: CPT | Performed by: NURSE PRACTITIONER

## 2020-10-13 NOTE — PATIENT INSTRUCTIONS
Preventive Care 21-39 Years Old, Female  Preventive care refers to visits with your health care provider and lifestyle choices that can promote health and wellness. This includes:  · A yearly physical exam. This may also be called an annual well check.  · Regular dental visits and eye exams.  · Immunizations.  · Screening for certain conditions.  · Healthy lifestyle choices, such as eating a healthy diet, getting regular exercise, not using drugs or products that contain nicotine and tobacco, and limiting alcohol use.  What can I expect for my preventive care visit?  Physical exam  Your health care provider will check your:  · Height and weight. This may be used to calculate body mass index (BMI), which tells if you are at a healthy weight.  · Heart rate and blood pressure.  · Skin for abnormal spots.  Counseling  Your health care provider may ask you questions about your:  · Alcohol, tobacco, and drug use.  · Emotional well-being.  · Home and relationship well-being.  · Sexual activity.  · Eating habits.  · Work and work environment.  · Method of birth control.  · Menstrual cycle.  · Pregnancy history.  What immunizations do I need?    Influenza (flu) vaccine  · This is recommended every year.  Tetanus, diphtheria, and pertussis (Tdap) vaccine  · You may need a Td booster every 10 years.  Varicella (chickenpox) vaccine  · You may need this if you have not been vaccinated.  Human papillomavirus (HPV) vaccine  · If recommended by your health care provider, you may need three doses over 6 months.  Measles, mumps, and rubella (MMR) vaccine  · You may need at least one dose of MMR. You may also need a second dose.  Meningococcal conjugate (MenACWY) vaccine  · One dose is recommended if you are age 19-21 years and a first-year college student living in a residence bae, or if you have one of several medical conditions. You may also need additional booster doses.  Pneumococcal conjugate (PCV13) vaccine  · You may need  this if you have certain conditions and were not previously vaccinated.  Pneumococcal polysaccharide (PPSV23) vaccine  · You may need one or two doses if you smoke cigarettes or if you have certain conditions.  Hepatitis A vaccine  · You may need this if you have certain conditions or if you travel or work in places where you may be exposed to hepatitis A.  Hepatitis B vaccine  · You may need this if you have certain conditions or if you travel or work in places where you may be exposed to hepatitis B.  Haemophilus influenzae type b (Hib) vaccine  · You may need this if you have certain conditions.  You may receive vaccines as individual doses or as more than one vaccine together in one shot (combination vaccines). Talk with your health care provider about the risks and benefits of combination vaccines.  What tests do I need?    Blood tests  · Lipid and cholesterol levels. These may be checked every 5 years starting at age 20.  · Hepatitis C test.  · Hepatitis B test.  Screening  · Diabetes screening. This is done by checking your blood sugar (glucose) after you have not eaten for a while (fasting).  · Sexually transmitted disease (STD) testing.  · BRCA-related cancer screening. This may be done if you have a family history of breast, ovarian, tubal, or peritoneal cancers.  · Pelvic exam and Pap test. This may be done every 3 years starting at age 21. Starting at age 30, this may be done every 5 years if you have a Pap test in combination with an HPV test.  Talk with your health care provider about your test results, treatment options, and if necessary, the need for more tests.  Follow these instructions at home:  Eating and drinking    · Eat a diet that includes fresh fruits and vegetables, whole grains, lean protein, and low-fat dairy.  · Take vitamin and mineral supplements as recommended by your health care provider.  · Do not drink alcohol if:  ? Your health care provider tells you not to drink.  ? You are  pregnant, may be pregnant, or are planning to become pregnant.  · If you drink alcohol:  ? Limit how much you have to 0-1 drink a day.  ? Be aware of how much alcohol is in your drink. In the [place] one drink equals one 12 oz bottle of beer (355 mL), one 5 oz glass of wine (148 mL), or one 1½ oz glass of hard liquor (44 mL).  Lifestyle  · Take daily care of your teeth and gums.  · Stay active. Exercise for at least 30 minutes on 5 or more days each week.  · Do not use any products that contain nicotine or tobacco, such as cigarettes, e-cigarettes, and chewing tobacco. If you need help quitting, ask your health care provider.  · If you are sexually active, practice safe sex. Use a condom or other form of birth control (contraception) in order to prevent pregnancy and STIs (sexually transmitted infections). If you plan to become pregnant, see your health care provider for a preconception visit.  What's next?  · Visit your health care provider once a year for a well check visit.  · Ask your health care provider how often you should have your eyes and teeth checked.  · Stay up to date on all vaccines.  This information is not intended to replace advice given to you by your health care provider. Make sure you discuss any questions you have with your health care provider.  Document Released: 02/13/2003 Document Revised: 08/29/2019 Document Reviewed: 08/29/2019  Elsevier Patient Education © 2020 [employer] Inc[employer]
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
## 2020-10-14 LAB
ALBUMIN SERPL-MCNC: 4.5 G/DL (ref 3.5–5.2)
ALBUMIN/GLOB SERPL: 1.5 G/DL
ALP SERPL-CCNC: 55 U/L (ref 39–117)
ALT SERPL-CCNC: 10 U/L (ref 1–33)
AST SERPL-CCNC: 13 U/L (ref 1–32)
BILIRUB SERPL-MCNC: 0.3 MG/DL (ref 0–1.2)
BUN SERPL-MCNC: 13 MG/DL (ref 6–20)
BUN/CREAT SERPL: 15.9 (ref 7–25)
CALCIUM SERPL-MCNC: 9.3 MG/DL (ref 8.6–10.5)
CHLORIDE SERPL-SCNC: 102 MMOL/L (ref 98–107)
CHOLEST SERPL-MCNC: 154 MG/DL (ref 0–200)
CO2 SERPL-SCNC: 23.2 MMOL/L (ref 22–29)
CREAT SERPL-MCNC: 0.82 MG/DL (ref 0.57–1)
ERYTHROCYTE [DISTWIDTH] IN BLOOD BY AUTOMATED COUNT: 13 % (ref 12.3–15.4)
GLOBULIN SER CALC-MCNC: 3 GM/DL
GLUCOSE SERPL-MCNC: 97 MG/DL (ref 65–99)
HCT VFR BLD AUTO: 38.3 % (ref 34–46.6)
HDLC SERPL-MCNC: 65 MG/DL (ref 40–60)
HGB BLD-MCNC: 12.9 G/DL (ref 12–15.9)
LDLC SERPL CALC-MCNC: 56 MG/DL (ref 0–100)
LDLC/HDLC SERPL: 0.73 {RATIO}
MCH RBC QN AUTO: 26.6 PG (ref 26.6–33)
MCHC RBC AUTO-ENTMCNC: 33.7 G/DL (ref 31.5–35.7)
MCV RBC AUTO: 79 FL (ref 79–97)
PLATELET # BLD AUTO: 338 10*3/MM3 (ref 140–450)
POTASSIUM SERPL-SCNC: 4.2 MMOL/L (ref 3.5–5.2)
PROT SERPL-MCNC: 7.5 G/DL (ref 6–8.5)
RBC # BLD AUTO: 4.85 10*6/MM3 (ref 3.77–5.28)
SODIUM SERPL-SCNC: 138 MMOL/L (ref 136–145)
TRIGL SERPL-MCNC: 208 MG/DL (ref 0–150)
VLDLC SERPL CALC-MCNC: 33 MG/DL (ref 5–40)
WBC # BLD AUTO: 11.42 10*3/MM3 (ref 3.4–10.8)

## 2020-10-14 PROCEDURE — 90471 IMMUNIZATION ADMIN: CPT | Performed by: NURSE PRACTITIONER

## 2020-10-14 PROCEDURE — 90686 IIV4 VACC NO PRSV 0.5 ML IM: CPT | Performed by: NURSE PRACTITIONER

## 2020-11-09 ENCOUNTER — TELEPHONE (OUTPATIENT)
Dept: FAMILY MEDICINE CLINIC | Facility: CLINIC | Age: 30
End: 2020-11-09

## 2020-11-09 NOTE — TELEPHONE ENCOUNTER
PATIENT CALLED AND STATED THAT SHE PICKED UP HER LISINOPRIL FROM THE PHARMACY AND IT WAS ONLY FOR THE LISINOPRIL 20MG, SHE HAS BEEN TAKING lisinopril-hydrochlorothiazide (PRINZIDE,ZESTORETIC) 20-12.5 MG per tablet    PATIENT STATED SHE WANTS TO KNOW IF [facility] HAS CHANGED HER MEDICATION. THE PATIENT STATED THAT THE LISINOPRIL/HCTZ WAS WORKING WELL FOR HER. THE PATIENT ALSO STATED THAT THERE WAS ONLY 1 REFILL SENT IN AS WELL, SHE IS REQUESTING MORE REFILLS ON THE MEDICATION.    PATIENT WOULD LIKE A CALLBACK LETTING HER KNOW IF HER MEDICATION HAS BEEN CHANGED OR IF THIS IS A PHARMACY MISTAKE.    PATIENT ONLY HAS 1 TABLET LEFT OF THE lisinopril-hydrochlorothiazide (PRINZIDE,ZESTORETIC) 20-12.5 MG per tablet   IF [facility] WANTS HER TO STAY ON THIS MEDICATION SHE WILL NEED A REFILL SENT TO HER PHARMACY.    PHARMACY CONFIRMED:  [facility] #[id number] - [place], [place] - [address] [facility] [address] [address]  - [phone] [facility] [phone]      PATIENT CALLBACK: [phone]

## 2020-11-10 RX ORDER — LISINOPRIL AND HYDROCHLOROTHIAZIDE 20; 12.5 MG/1; MG/1
1 TABLET ORAL DAILY
Qty: 30 TABLET | Refills: 0 | Status: CANCELLED | OUTPATIENT
Start: 2020-11-10

## 2020-11-10 NOTE — TELEPHONE ENCOUNTER
Patient calling back to follow up on her med request. Please call back asap because she took her last pill yesterday. [phone].    lisinopril-hydrochlorothiazide (PRINZIDE,ZESTORETIC) 20-12.5 MG per tablet    Pharmacy-[facility] #[id number] - [place], [place] - [address] [place] [address] [facility] [address] [place] [address] - [phone] PH - [phone]   [phone]

## 2020-11-11 RX ORDER — LISINOPRIL AND HYDROCHLOROTHIAZIDE 20; 12.5 MG/1; MG/1
1 TABLET ORAL DAILY
Qty: 90 TABLET | Refills: 3 | Status: SHIPPED | OUTPATIENT
Start: 2020-11-11 | End: 2021-06-28

## 2020-11-11 NOTE — TELEPHONE ENCOUNTER
PT IS CALLING IN STATING THAT SHE FEELS LIKE HER LISINOPRIL THAT SHE PICKED UP WAS INCORRECT.  PT STATES THAT SHE IS USUALLY PRESCRIBED THE HTCZ AND SHE JUST GOT THE REGULAR LISINOPRIL.  PT WANTS TO BE CALLED BACK REGARDING THIS. PT SAID THAT SHE HAS BEEN CALLING SINCE Monday AND STILL HAS NOT HEARD ANYTHING, SHES REQUESTING TO BE CALLED BACK TODAY.      PT CALL BACK  [phone]  PHARMACY  [place] [phone]

## 2020-12-10 DIAGNOSIS — E28.2 PCOS (POLYCYSTIC OVARIAN SYNDROME): ICD-10-CM

## 2020-12-10 RX ORDER — METFORMIN HYDROCHLORIDE 500 MG/1
1000 TABLET, EXTENDED RELEASE ORAL
Qty: 180 TABLET | Refills: 1 | Status: SHIPPED | OUTPATIENT
Start: 2020-12-10 | End: 2021-12-14 | Stop reason: SDUPTHER

## 2021-01-06 ENCOUNTER — OFFICE VISIT (OUTPATIENT)
Dept: FAMILY MEDICINE CLINIC | Facility: CLINIC | Age: 31
End: 2021-01-06

## 2021-01-06 VITALS
SYSTOLIC BLOOD PRESSURE: 138 MMHG | HEART RATE: 94 BPM | WEIGHT: 258 LBS | OXYGEN SATURATION: 99 % | BODY MASS INDEX: 38.21 KG/M2 | TEMPERATURE: 98.1 F | DIASTOLIC BLOOD PRESSURE: 80 MMHG | HEIGHT: 69 IN | RESPIRATION RATE: 16 BRPM

## 2021-01-06 DIAGNOSIS — R03.0 TRANSIENT ELEVATED BLOOD PRESSURE: ICD-10-CM

## 2021-01-06 DIAGNOSIS — I10 BENIGN ESSENTIAL HYPERTENSION: ICD-10-CM

## 2021-01-06 DIAGNOSIS — R59.0 CERVICAL ADENOPATHY: Primary | ICD-10-CM

## 2021-01-06 PROCEDURE — 99213 OFFICE O/P EST LOW 20 MIN: CPT | Performed by: NURSE PRACTITIONER

## 2021-01-06 RX ORDER — TOPIRAMATE 100 MG/1
TABLET, FILM COATED ORAL
COMMUNITY
Start: 2021-01-04 | End: 2021-06-28

## 2021-01-06 RX ORDER — AMLODIPINE BESYLATE 5 MG/1
5 TABLET ORAL DAILY
Qty: 30 TABLET | Refills: 1 | Status: SHIPPED | OUTPATIENT
Start: 2021-01-06 | End: 2021-02-04 | Stop reason: SDUPTHER

## 2021-01-06 RX ORDER — NORGESTIMATE AND ETHINYL ESTRADIOL 0.25-0.035
1 KIT ORAL DAILY
COMMUNITY
Start: 2020-12-21 | End: 2021-09-13

## 2021-01-06 NOTE — PROGRESS NOTES
Subjective   Windy Gaston is a 30 y.o. female.   Jaw Pain    History of Present Illness   Here w/ pain in the RT side of her neck and moving towards her Rt ear. She states this has sy going on about a week now and initially was taking Ibu for it.  She states yesterday was the first day she did not.    She is also concerned about increase in BP.  She has never been on any medication.    The following portions of the patient's history were reviewed and updated as appropriate: allergies, current medications, past family history, past medical history, past social history, past surgical history and problem list.    Review of Systems   Constitutional: Negative for activity change, appetite change, chills and fever.   HENT: Positive for swollen glands. Negative for congestion.    Eyes: Negative for pain.   Respiratory: Negative for cough and shortness of breath.    Cardiovascular: Negative for chest pain and leg swelling.   Gastrointestinal: Negative for nausea and vomiting.   Genitourinary: Negative for difficulty urinating.   Skin: Negative for rash.   Neurological: Negative for dizziness, facial asymmetry and headache.       Objective   Physical Exam  Vitals signs and nursing note reviewed.   Constitutional:       General: She is not in acute distress.     Appearance: She is well-developed.   HENT:      Head: Normocephalic and atraumatic.      Right Ear: External ear normal.      Left Ear: External ear normal.   Neck:      Musculoskeletal: Neck supple.      Thyroid: No thyromegaly.   Cardiovascular:      Rate and Rhythm: Normal rate and regular rhythm.      Heart sounds: Normal heart sounds.   Pulmonary:      Effort: Pulmonary effort is normal.      Breath sounds: Normal breath sounds.   Musculoskeletal: Normal range of motion.   Skin:     General: Skin is warm.   Neurological:      Mental Status: She is alert.           Assessment/Plan   Problem List Items Addressed This Visit        Cardiac and Vasculature    Benign  essential hypertension    Relevant Medications    amLODIPine (NORVASC) 5 MG tablet      Other Visit Diagnoses     Cervical adenopathy    -  Primary    Relevant Orders    CBC (No Diff) (Completed)    Transient elevated blood pressure            Started her on some amlodipine and will return in 4 weeks with a BP diary.  Will call with CBC results.       Return in about 4 weeks (around 2/3/2021) for Recheck.

## 2021-01-07 LAB
ERYTHROCYTE [DISTWIDTH] IN BLOOD BY AUTOMATED COUNT: 13.4 % (ref 11.7–15.4)
HCT VFR BLD AUTO: 34.5 % (ref 34–46.6)
HGB BLD-MCNC: 11.6 G/DL (ref 11.1–15.9)
MCH RBC QN AUTO: 27.1 PG (ref 26.6–33)
MCHC RBC AUTO-ENTMCNC: 33.6 G/DL (ref 31.5–35.7)
MCV RBC AUTO: 81 FL (ref 79–97)
PLATELET # BLD AUTO: 292 X10E3/UL (ref 150–450)
RBC # BLD AUTO: 4.28 X10E6/UL (ref 3.77–5.28)
WBC # BLD AUTO: 10.6 X10E3/UL (ref 3.4–10.8)

## 2021-02-04 ENCOUNTER — OFFICE VISIT (OUTPATIENT)
Dept: FAMILY MEDICINE CLINIC | Facility: CLINIC | Age: 31
End: 2021-02-04

## 2021-02-04 VITALS
HEIGHT: 69 IN | WEIGHT: 251 LBS | SYSTOLIC BLOOD PRESSURE: 124 MMHG | HEART RATE: 68 BPM | BODY MASS INDEX: 37.18 KG/M2 | DIASTOLIC BLOOD PRESSURE: 68 MMHG | RESPIRATION RATE: 14 BRPM | OXYGEN SATURATION: 100 %

## 2021-02-04 DIAGNOSIS — I10 ESSENTIAL HYPERTENSION: Primary | ICD-10-CM

## 2021-02-04 PROCEDURE — 99213 OFFICE O/P EST LOW 20 MIN: CPT | Performed by: NURSE PRACTITIONER

## 2021-02-04 RX ORDER — AMLODIPINE BESYLATE 5 MG/1
5 TABLET ORAL DAILY
Qty: 90 TABLET | Refills: 1 | Status: SHIPPED | OUTPATIENT
Start: 2021-02-04 | End: 2021-06-28

## 2021-02-04 NOTE — PROGRESS NOTES
Subjective   Windy Gaston is a 30 y.o. female.   Hypertension    History of Present Illness   Had some elevated blood pressures issues and was in the middle of a divorce and moved into an apartment recently. She reports she feels pretty good. She no longer has headaches or fatigue.    The following portions of the patient's history were reviewed and updated as appropriate: allergies, current medications, past family history, past medical history, past social history, past surgical history and problem list.    Review of Systems   Constitutional: Negative for activity change, appetite change, chills and fever.   HENT: Negative for congestion.    Eyes: Negative for pain.   Respiratory: Negative for cough and shortness of breath.    Cardiovascular: Negative for chest pain and leg swelling.   Gastrointestinal: Negative for nausea and vomiting.   Genitourinary: Negative for difficulty urinating.   Skin: Negative for rash.   Neurological: Negative for dizziness, facial asymmetry, light-headedness and headache.       Objective   Physical Exam  Vitals signs and nursing note reviewed.   Constitutional:       Appearance: She is well-developed.   HENT:      Head: Normocephalic and atraumatic.   Eyes:      Pupils: Pupils are equal, round, and reactive to light.   Cardiovascular:      Rate and Rhythm: Normal rate and regular rhythm.   Pulmonary:      Effort: Pulmonary effort is normal.   Musculoskeletal: Normal range of motion.   Skin:     General: Skin is warm and dry.   Neurological:      Mental Status: She is alert and oriented to person, place, and time.           Assessment/Plan   Problem List Items Addressed This Visit     None      Visit Diagnoses     Essential hypertension    -  Primary    Relevant Medications    amLODIPine (NORVASC) 5 MG tablet        Continue the amlodipine and will follow up in 6 months.       Return in about 6 months (around 8/4/2021).

## 2021-03-04 ENCOUNTER — OFFICE VISIT (OUTPATIENT)
Dept: FAMILY MEDICINE CLINIC | Facility: CLINIC | Age: 31
End: 2021-03-04

## 2021-03-04 ENCOUNTER — TELEPHONE (OUTPATIENT)
Dept: FAMILY MEDICINE CLINIC | Facility: CLINIC | Age: 31
End: 2021-03-04

## 2021-03-04 VITALS
HEART RATE: 94 BPM | SYSTOLIC BLOOD PRESSURE: 138 MMHG | BODY MASS INDEX: 37.77 KG/M2 | WEIGHT: 255 LBS | DIASTOLIC BLOOD PRESSURE: 66 MMHG | HEIGHT: 69 IN | OXYGEN SATURATION: 98 % | RESPIRATION RATE: 16 BRPM

## 2021-03-04 DIAGNOSIS — L02.91 ABSCESS: Primary | ICD-10-CM

## 2021-03-04 PROCEDURE — 99213 OFFICE O/P EST LOW 20 MIN: CPT | Performed by: NURSE PRACTITIONER

## 2021-03-04 RX ORDER — CLINDAMYCIN HYDROCHLORIDE 300 MG/1
300 CAPSULE ORAL 3 TIMES DAILY
Qty: 30 CAPSULE | Refills: 0 | Status: SHIPPED | OUTPATIENT
Start: 2021-03-04 | End: 2021-06-28

## 2021-03-04 NOTE — PROGRESS NOTES
Subjective   Windy Gaston is a 30 y.o. female.   Cyst (vaginal, painful)    History of Present Illness   Vaginal abscess to her labia majora.  She noticed it a couple days ago.  She has started with sits baths and Epson salts which seem to help the pain.  She has no discharge or fever.    The following portions of the patient's history were reviewed and updated as appropriate: allergies, current medications, past family history, past medical history, past social history, past surgical history and problem list.    Review of Systems   Constitutional: Negative for activity change, appetite change, chills and fever.   HENT: Negative for congestion.    Gastrointestinal: Negative for nausea and vomiting.   Genitourinary: Positive for vaginal pain. Negative for difficulty urinating and urgency.   Skin: Negative for rash.   Neurological: Negative for dizziness, facial asymmetry and headache.       Objective   Physical Exam  Vitals signs and nursing note reviewed.   Constitutional:       Appearance: She is well-developed.   HENT:      Head: Normocephalic and atraumatic.   Eyes:      Pupils: Pupils are equal, round, and reactive to light.   Pulmonary:      Effort: Pulmonary effort is normal.   Genitourinary:         Comments: She has an abscess to her right labia majora.  It looks like it is ready to drain.  Musculoskeletal: Normal range of motion.   Skin:     General: Skin is warm and dry.   Neurological:      Mental Status: She is alert and oriented to person, place, and time.           Assessment/Plan   Problem List Items Addressed This Visit     None      Visit Diagnoses     Abscess    -  Primary        Suggested sitz bath with Epson salts as much as possible.  Placed her on 3 mg of clindamycin 3 times daily for 10 days.  She was advised to go to the ER if she starts with a fever and vomiting.       No follow-ups on file.

## 2021-03-04 NOTE — PATIENT INSTRUCTIONS
Skin Abscess    A skin abscess is an infected area on or under your skin that contains a collection of pus and other material. An abscess may also be called a furuncle, carbuncle, or boil. An abscess can occur in or on almost any part of your body.  Some abscesses break open (rupture) on their own. Most continue to get worse unless they are treated. The infection can spread deeper into the body and eventually into your blood, which can make you feel ill. Treatment usually involves draining the abscess.  What are the causes?  An abscess occurs when germs, like bacteria, pass through your skin and cause an infection. This may be caused by:  · A scrape or cut on your skin.  · A puncture wound through your skin, including a needle injection or insect bite.  · Blocked oil or sweat glands.  · Blocked and infected hair follicles.  · A cyst that forms beneath your skin (sebaceous cyst) and becomes infected.  What increases the risk?  This condition is more likely to develop in people who:  · Have a weak body defense system (immune system).  · Have diabetes.  · Have dry and irritated skin.  · Get frequent injections or use illegal IV drugs.  · Have a foreign body in a wound, such as a splinter.  · Have problems with their lymph system or veins.  What are the signs or symptoms?  Symptoms of this condition include:  · A painful, firm bump under the skin.  · A bump with pus at the top. This may break through the skin and drain.  Other symptoms include:  · Redness surrounding the abscess site.  · Warmth.  · Swelling of the lymph nodes (glands) near the abscess.  · Tenderness.  · A sore on the skin.  How is this diagnosed?  This condition may be diagnosed based on:  · A physical exam.  · Your medical history.  · A sample of pus. This may be used to find out what is causing the infection.  · Blood tests.  · Imaging tests, such as an ultrasound, CT scan, or MRI.  How is this treated?  A small abscess that drains on its own may not  need treatment. Treatment for larger abscesses may include:  · Moist heat or heat pack applied to the area several times a day.  · A procedure to drain the abscess (incision and drainage).  · Antibiotic medicines. For a severe abscess, you may first get antibiotics through an IV and then change to antibiotics by mouth.  Follow these instructions at home:  Medicines    · Take over-the-counter and prescription medicines only as told by your health care provider.  · If you were prescribed an antibiotic medicine, take it as told by your health care provider. Do not stop taking the antibiotic even if you start to feel better.  Abscess care    · If you have an abscess that has not drained, apply heat to the affected area. Use the heat source that your health care provider recommends, such as a moist heat pack or a heating pad.  ? Place a towel between your skin and the heat source.  ? Leave the heat on for 20-30 minutes.  ? Remove the heat if your skin turns bright red. This is especially important if you are unable to feel pain, heat, or cold. You may have a greater risk of getting burned.  · Follow instructions from your health care provider about how to take care of your abscess. Make sure you:  ? Cover the abscess with a bandage (dressing).  ? Change your dressing or gauze as told by your health care provider.  ? Wash your hands with soap and water before you change the dressing or gauze. If soap and water are not available, use hand .  · Check your abscess every day for signs of a worsening infection. Check for:  ? More redness, swelling, or pain.  ? More fluid or blood.  ? Warmth.  ? More pus or a bad smell.  General instructions  · To avoid spreading the infection:  ? Do not share personal care items, towels, or hot tubs with others.  ? Avoid making skin contact with other people.  · Keep all follow-up visits as told by your health care provider. This is important.  Contact a health care provider if you  have:  · More redness, swelling, or pain around your abscess.  · More fluid or blood coming from your abscess.  · Warm skin around your abscess.  · More pus or a bad smell coming from your abscess.  · A fever.  · Muscle aches.  · Chills or a general ill feeling.  Get help right away if you:  · Have severe pain.  · See red streaks on your skin spreading away from the abscess.  Summary  · A skin abscess is an infected area on or under your skin that contains a collection of pus and other material.  · A small abscess that drains on its own may not need treatment.  · Treatment for larger abscesses may include having a procedure to drain the abscess and taking an antibiotic.  This information is not intended to replace advice given to you by your health care provider. Make sure you discuss any questions you have with your health care provider.  Document Revised: 04/09/2020 Document Reviewed: 01/31/2019  [employer] Patient Education © 2020 [employer]

## 2021-03-04 NOTE — TELEPHONE ENCOUNTER
Caller: Windy Gaston    Relationship to patient: Self    Best call back number: [phone]    Patient is needing: PATIENT STATES SHE SAW LETICIA VILLA TODAY.  SHE MENTIONED THAT SHE HAD AN ABCESS ON HER LABIA THAT HAD A WHITE HEAD ON IT.  PATIENT WOULD LIKE TO KNOW IF IT WOULD BE OKAY TO TRY TO GENTLY WORK AT IT WITH A WASH CLOTH TO GET IT TO POP DURING HER BATH.    PLEASE CALL AND ADVISE

## 2021-04-01 ENCOUNTER — TELEPHONE (OUTPATIENT)
Dept: FAMILY MEDICINE CLINIC | Facility: CLINIC | Age: 31
End: 2021-04-01

## 2021-04-01 ENCOUNTER — TELEMEDICINE (OUTPATIENT)
Dept: FAMILY MEDICINE CLINIC | Facility: CLINIC | Age: 31
End: 2021-04-01

## 2021-04-01 DIAGNOSIS — N76.4 ABSCESS OF GENITAL LABIA: Primary | ICD-10-CM

## 2021-04-01 PROCEDURE — 99213 OFFICE O/P EST LOW 20 MIN: CPT | Performed by: NURSE PRACTITIONER

## 2021-04-01 RX ORDER — SULFAMETHOXAZOLE AND TRIMETHOPRIM 800; 160 MG/1; MG/1
1 TABLET ORAL 2 TIMES DAILY
Qty: 20 TABLET | Refills: 0 | Status: SHIPPED | OUTPATIENT
Start: 2021-04-01 | End: 2021-06-28

## 2021-04-01 NOTE — PROGRESS NOTES
Subjective   Windy Gaston is a 30 y.o. female.     History of Present Illness  Video visit  Was seen about 4 weeks ago for a labial abscess and we placed her on  clindamycin and told to do frequent sitz bath's.  She reports that it went away and then this morning it came back.  She reports it is painful when she sits down.  And she has already had 1 sitz bath today.  She denies any fever or nausea  The following portions of the patient's history were reviewed and updated as appropriate: current medications, past family history, past medical history, past surgical history and problem list.    Review of Systems   Constitutional: Positive for activity change. Negative for appetite change and fever.   Gastrointestinal: Negative for abdominal pain, nausea and vomiting.   Genitourinary:        Swollen painful area to her right labia majora       Objective   Physical Exam  Vitals and nursing note reviewed.   Constitutional:       Appearance: She is well-developed.   HENT:      Head: Normocephalic and atraumatic.   Eyes:      Pupils: Pupils are equal, round, and reactive to light.   Pulmonary:      Effort: Pulmonary effort is normal.   Genitourinary:         Comments: Reports she has an abscess to her right labia majora where she did last time.  Musculoskeletal:         General: Normal range of motion.   Skin:     General: Skin is warm and dry.   Neurological:      Mental Status: She is alert and oriented to person, place, and time.         Assessment/Plan   Diagnoses and all orders for this visit:    1. Abscess of genital labia (Primary)    Other orders  -     sulfamethoxazole-trimethoprim (Bactrim DS) 800-160 MG per tablet; Take 1 tablet by mouth 2 (Two) Times a Day.  Dispense: 20 tablet; Refill: 0       Bactrim DS was prescribed.  We discussed sits baths as much as possible.  Advised her to call her gynecologist in the morning and if they could not take care of this we would send her to general surgery.  The use of a  video visit has been reviewed with the patient and verbal informed consent has been obtained.

## 2021-04-01 NOTE — TELEPHONE ENCOUNTER
Caller: Windy Gaston    Relationship: Self    Best call back number: [phone]    What is the best time to reach you: ANYTIME    Who are you requesting to speak with (clinical staff, provider,  specific staff member): CLINICAL STAFF    What was the call regarding: PATIENT CALLED STATING SHE WAS IN OFFICE THE BEGINNING OF MARCH WITH AN ABCESS. LETICIA PUT PATIENT ON ANTIBIOTICS. PATIENT STATED THE BUMP DID NOT TOTALLY GO AWAY BUT WENT DOWN. AS OF THIS MORNING IT HAS STARTED GROWING AGAIN, RED, SLIGHTLY PAINFUL AND FEVERED. PATIENT WOULD LIKE A CALL BACK FROM LETICIA TO FIND OUT WHAT TO DO NEXT.    Do you require a callback: YES    PATIENT IS ALSO SCHEDULED FOR HER 2ND COVID VACCINATION FOR THE 12TH OF April AND NOT SURE IF SHE IS ON ANTIBIOTICS IF SHE SHOULD TAKE THE VACCINATION.

## 2021-04-01 NOTE — PATIENT INSTRUCTIONS
Skin Abscess    A skin abscess is an infected area on or under your skin that contains a collection of pus and other material. An abscess may also be called a furuncle, carbuncle, or boil. An abscess can occur in or on almost any part of your body.  Some abscesses break open (rupture) on their own. Most continue to get worse unless they are treated. The infection can spread deeper into the body and eventually into your blood, which can make you feel ill. Treatment usually involves draining the abscess.  What are the causes?  An abscess occurs when germs, like bacteria, pass through your skin and cause an infection. This may be caused by:  · A scrape or cut on your skin.  · A puncture wound through your skin, including a needle injection or insect bite.  · Blocked oil or sweat glands.  · Blocked and infected hair follicles.  · A cyst that forms beneath your skin (sebaceous cyst) and becomes infected.  What increases the risk?  This condition is more likely to develop in people who:  · Have a weak body defense system (immune system).  · Have diabetes.  · Have dry and irritated skin.  · Get frequent injections or use illegal IV drugs.  · Have a foreign body in a wound, such as a splinter.  · Have problems with their lymph system or veins.  What are the signs or symptoms?  Symptoms of this condition include:  · A painful, firm bump under the skin.  · A bump with pus at the top. This may break through the skin and drain.  Other symptoms include:  · Redness surrounding the abscess site.  · Warmth.  · Swelling of the lymph nodes (glands) near the abscess.  · Tenderness.  · A sore on the skin.  How is this diagnosed?  This condition may be diagnosed based on:  · A physical exam.  · Your medical history.  · A sample of pus. This may be used to find out what is causing the infection.  · Blood tests.  · Imaging tests, such as an ultrasound, CT scan, or MRI.  How is this treated?  A small abscess that drains on its own may not  need treatment. Treatment for larger abscesses may include:  · Moist heat or heat pack applied to the area several times a day.  · A procedure to drain the abscess (incision and drainage).  · Antibiotic medicines. For a severe abscess, you may first get antibiotics through an IV and then change to antibiotics by mouth.  Follow these instructions at home:  Medicines    · Take over-the-counter and prescription medicines only as told by your health care provider.  · If you were prescribed an antibiotic medicine, take it as told by your health care provider. Do not stop taking the antibiotic even if you start to feel better.  Abscess care    · If you have an abscess that has not drained, apply heat to the affected area. Use the heat source that your health care provider recommends, such as a moist heat pack or a heating pad.  ? Place a towel between your skin and the heat source.  ? Leave the heat on for 20-30 minutes.  ? Remove the heat if your skin turns bright red. This is especially important if you are unable to feel pain, heat, or cold. You may have a greater risk of getting burned.  · Follow instructions from your health care provider about how to take care of your abscess. Make sure you:  ? Cover the abscess with a bandage (dressing).  ? Change your dressing or gauze as told by your health care provider.  ? Wash your hands with soap and water before you change the dressing or gauze. If soap and water are not available, use hand .  · Check your abscess every day for signs of a worsening infection. Check for:  ? More redness, swelling, or pain.  ? More fluid or blood.  ? Warmth.  ? More pus or a bad smell.  General instructions  · To avoid spreading the infection:  ? Do not share personal care items, towels, or hot tubs with others.  ? Avoid making skin contact with other people.  · Keep all follow-up visits as told by your health care provider. This is important.  Contact a health care provider if you  have:  · More redness, swelling, or pain around your abscess.  · More fluid or blood coming from your abscess.  · Warm skin around your abscess.  · More pus or a bad smell coming from your abscess.  · A fever.  · Muscle aches.  · Chills or a general ill feeling.  Get help right away if you:  · Have severe pain.  · See red streaks on your skin spreading away from the abscess.  Summary  · A skin abscess is an infected area on or under your skin that contains a collection of pus and other material.  · A small abscess that drains on its own may not need treatment.  · Treatment for larger abscesses may include having a procedure to drain the abscess and taking an antibiotic.  This information is not intended to replace advice given to you by your health care provider. Make sure you discuss any questions you have with your health care provider.  Document Revised: 04/09/2020 Document Reviewed: 01/31/2019  [employer] Patient Education © 2021 [employer]

## 2021-04-16 ENCOUNTER — BULK ORDERING (OUTPATIENT)
Dept: CASE MANAGEMENT | Facility: OTHER | Age: 31
End: 2021-04-16

## 2021-04-16 DIAGNOSIS — Z23 IMMUNIZATION DUE: ICD-10-CM

## 2021-06-14 ENCOUNTER — TELEPHONE (OUTPATIENT)
Dept: FAMILY MEDICINE CLINIC | Facility: CLINIC | Age: 31
End: 2021-06-14

## 2021-06-14 NOTE — TELEPHONE ENCOUNTER
Caller: Windy Reid    Relationship: Self    Best call back number: [phone]    What is the best time to reach you: ANY    Who are you requesting to speak with (clinical staff, provider,  specific staff member): PROVIDER    Do you require a callback: YES.  PATIENT HAS LOST WEIGHT AND WITH HER BLOOD PRESSURE MEDICATION SHE HAS BEGUN TO HAVE DIZZY SPELLS. DUE TO THE DIZZY SPELLS SHE HASN'T TAKEN HER MEDICATION AND WITHOUT THIS HER BP IS 6/11  108/76 AND THIS MORNING 120/73.  SHE HAS BEEN MONITORING IN THE MORNINGS  TOP NUMBER   AND THE BOTTOM NUMBER HAS BEEN MID 60'S TO MID 70'S SINCE April.    PLEASE ADVISE CARE OR DOES SHE NEED TO BE SEEN?

## 2021-06-15 NOTE — TELEPHONE ENCOUNTER
Spoke to pt.  Pt is unable to schedule due to insurance changing.    She is scheduled with a new provider soon and will address with them

## 2021-06-28 ENCOUNTER — OFFICE VISIT (OUTPATIENT)
Dept: FAMILY MEDICINE CLINIC | Facility: CLINIC | Age: 31
End: 2021-06-28

## 2021-06-28 VITALS
RESPIRATION RATE: 18 BRPM | SYSTOLIC BLOOD PRESSURE: 110 MMHG | WEIGHT: 231 LBS | HEIGHT: 69 IN | TEMPERATURE: 97.1 F | OXYGEN SATURATION: 99 % | HEART RATE: 85 BPM | DIASTOLIC BLOOD PRESSURE: 80 MMHG | BODY MASS INDEX: 34.21 KG/M2

## 2021-06-28 DIAGNOSIS — I10 BENIGN ESSENTIAL HYPERTENSION: Primary | ICD-10-CM

## 2021-06-28 PROCEDURE — 99213 OFFICE O/P EST LOW 20 MIN: CPT | Performed by: NURSE PRACTITIONER

## 2021-06-28 RX ORDER — LISINOPRIL AND HYDROCHLOROTHIAZIDE 12.5; 1 MG/1; MG/1
1 TABLET ORAL DAILY
Qty: 30 TABLET | Refills: 0 | Status: SHIPPED | OUTPATIENT
Start: 2021-06-28 | End: 2021-07-12

## 2021-06-28 RX ORDER — TOPIRAMATE 50 MG/1
50 TABLET, FILM COATED ORAL 3 TIMES DAILY
COMMUNITY
End: 2022-03-22

## 2021-06-28 NOTE — PROGRESS NOTES
"Isela Reid is a 31 y.o.. female.     Pt here today for c/o dizziness. Pt stating she has recently gotten a divorce, finalized in April and has lost 70 + lbs. Pt stating she is doing the noom diet, walk/run 4 times a week, swiming and walking her dog through out the week. Pt stating she is drinking about 8 glasses of 8 oz of water a day. Pt stating she was taken off Norvasc in February. Pt stating she is checking her b/p in am with results of 110-115/70's. Pt stating she is having dizzy spells off and on for about the last 2 weeks, and when she checks her b/p at that time systolic running around 98. Pt stating she is getting dizzy at least once a day.     Pt stating her last pap was 6/2020, LMP: 6/5/21, denies having any children    Pt's dog is a [employer].      The following portions of the patient's history were reviewed and updated as appropriate: allergies, current medications, past family history, past medical history, past social history, past surgical history and problem list.    Past Medical History:   Diagnosis Date   • Anemia    • Anxiety    • Asthma    • Menstrual problem    • PCOS (polycystic ovarian syndrome)        History reviewed. No pertinent surgical history.    Review of Systems   Constitutional: Negative for fever.   Respiratory: Negative for shortness of breath.    Cardiovascular: Negative for chest pain and palpitations.   Neurological: Negative for dizziness and headaches.       Allergies   Allergen Reactions   • Sertraline Palpitations     She had a really fast heart rate after a couple of doses and didn't tolerate a 1/2 dose.          Objective     Vitals:    06/28/21 0910   BP: 110/80   BP Location: Left arm   Patient Position: Sitting   Pulse: 85   Resp: 18   Temp: 97.1 °F (36.2 °C)   TempSrc: Oral   SpO2: 99%   Weight: 105 kg (231 lb)   Height: 175.3 cm (69.02\")     Body mass index is 34.1 kg/m².    Physical Exam  Vitals reviewed.   HENT:      Head: Normocephalic. "   Eyes:      Pupils: Pupils are equal, round, and reactive to light.   Neck:      Vascular: No carotid bruit.   Cardiovascular:      Rate and Rhythm: Normal rate and regular rhythm.      Pulses: Normal pulses.   Pulmonary:      Effort: Pulmonary effort is normal.      Breath sounds: Normal breath sounds.   Musculoskeletal:         General: Normal range of motion.      Right lower leg: Edema (trace) present.      Left lower leg: Edema (trace) present.   Skin:     General: Skin is warm and dry.   Neurological:      Mental Status: She is alert and oriented to person, place, and time.   Psychiatric:         Behavior: Behavior normal.           Current Outpatient Medications:   •  albuterol sulfate  (90 Base) MCG/ACT inhaler, Inhale 2 puffs Every 4 (Four) Hours As Needed for Wheezing., Disp: 1 inhaler, Rfl: 1  •  Estarylla 0.25-35 MG-MCG per tablet, Take 1 tablet by mouth Daily., Disp: , Rfl:   •  fluocinolone acetonide (DERMOTIC) 0.01 % oil otic oil, PLACE 5 DROPS IN RIGHT EAR BID, Disp: , Rfl: 1  •  ibuprofen (ADVIL,MOTRIN) 200 MG tablet, Take 200 mg by mouth every 6 (six) hours as needed for mild pain (1-3)., Disp: , Rfl:   •  metFORMIN ER (Glucophage XR) 500 MG 24 hr tablet, Take 2 tablets by mouth Daily With Breakfast., Disp: 180 tablet, Rfl: 1  •  topiramate (TOPAMAX) 50 MG tablet, Take 50 mg by mouth 3 (Three) Times a Day. anxiety, Disp: , Rfl:   •  lisinopril-hydrochlorothiazide (Zestoretic) 10-12.5 MG per tablet, Take 1 tablet by mouth Daily for 30 days., Disp: 30 tablet, Rfl: 0        Assessment/Plan   Diagnoses and all orders for this visit:    1. Benign essential hypertension (Primary)  -     lisinopril-hydrochlorothiazide (Zestoretic) 10-12.5 MG per tablet; Take 1 tablet by mouth Daily for 30 days.  Dispense: 30 tablet; Refill: 0        Patient Instructions   HTN: decreasing lisinopril/hctz from 20/12.5 to 10/12.5 daily. Will having pt check b/p twice a day and during dizzy spells and rto in 2 weeks  for follow up. Pt to call/rto sooner if having worsening dizzy spells or lower b/p results. Pt to continue diet and exercise as current and making sure to drink her 64 oz of water daily and to replace fluids when sweating to prevent dehydration. Pt verb. Understanding to all above.     Last annual physical exam was Oct 2020 (showed borderline anemia and hypertriglyceridemia)       Return for follow up in 2 weeks for HTN recheck.

## 2021-06-28 NOTE — PATIENT INSTRUCTIONS
HTN: decreasing lisinopril/hctz from 20/12.5 to 10/12.5 daily. Will having pt check b/p twice a day and during dizzy spells and rto in 2 weeks for follow up. Pt to call/rto sooner if having worsening dizzy spells or lower b/p results. Pt to continue diet and exercise as current and making sure to drink her 64 oz of water daily and to replace fluids when sweating to prevent dehydration. Pt verb. Understanding to all above.     Last annual physical exam was Oct 2020 (showed borderline anemia and hypertriglyceridemia)

## 2021-07-12 ENCOUNTER — OFFICE VISIT (OUTPATIENT)
Dept: FAMILY MEDICINE CLINIC | Facility: CLINIC | Age: 31
End: 2021-07-12

## 2021-07-12 VITALS
SYSTOLIC BLOOD PRESSURE: 116 MMHG | TEMPERATURE: 98 F | BODY MASS INDEX: 34.16 KG/M2 | RESPIRATION RATE: 18 BRPM | WEIGHT: 230.6 LBS | OXYGEN SATURATION: 98 % | HEIGHT: 69 IN | HEART RATE: 82 BPM | DIASTOLIC BLOOD PRESSURE: 72 MMHG

## 2021-07-12 DIAGNOSIS — I10 BENIGN ESSENTIAL HYPERTENSION: Primary | ICD-10-CM

## 2021-07-12 PROCEDURE — 99212 OFFICE O/P EST SF 10 MIN: CPT | Performed by: NURSE PRACTITIONER

## 2021-07-12 RX ORDER — NORGESTREL AND ETHINYL ESTRADIOL 0.3-0.03MG
1 KIT ORAL DAILY
COMMUNITY
Start: 2021-06-24

## 2021-07-12 RX ORDER — LISINOPRIL 5 MG/1
5 TABLET ORAL DAILY
Qty: 30 TABLET | Refills: 0 | Status: SHIPPED | OUTPATIENT
Start: 2021-07-12 | End: 2021-08-06

## 2021-07-12 RX ORDER — HYDROCHLOROTHIAZIDE 12.5 MG/1
12.5 TABLET ORAL DAILY
Qty: 30 TABLET | Refills: 0 | Status: SHIPPED | OUTPATIENT
Start: 2021-07-12 | End: 2021-08-06

## 2021-07-12 RX ORDER — LOTEPREDNOL ETABONATE 2 MG/ML
SUSPENSION/ DROPS OPHTHALMIC
COMMUNITY
Start: 2021-04-16 | End: 2021-08-11

## 2021-07-12 NOTE — PROGRESS NOTES
"Subjective     Windy Reid is a 31 y.o.. female.     Pt here today for follow up on HTN. Pt stating she has been checking her b/p's at home and getting /57-62 in am, and 110-114/68 in pm. Pt stating she is still getting the dizzy spells but not as bad as before. Pt stating that the dizziness happens more in afternoon, when relaxed. Pt denies any other issues/concerns at this time.      The following portions of the patient's history were reviewed and updated as appropriate: allergies, current medications, past family history, past medical history, past social history, past surgical history and problem list.    Past Medical History:   Diagnosis Date   • Anemia    • Anxiety    • Asthma    • Menstrual problem    • PCOS (polycystic ovarian syndrome)        No past surgical history on file.    Review of Systems   Constitutional: Negative.    Respiratory: Negative.    Cardiovascular: Negative.    Gastrointestinal: Negative.    Neurological: Positive for dizziness.       Allergies   Allergen Reactions   • Sertraline Palpitations     She had a really fast heart rate after a couple of doses and didn't tolerate a 1/2 dose.          Objective     Vitals:    07/12/21 0903   BP: 116/72   BP Location: Left arm   Patient Position: Sitting   Pulse: 82   Resp: 18   Temp: 98 °F (36.7 °C)   TempSrc: Oral   SpO2: 98%   Weight: 105 kg (230 lb 9.6 oz)   Height: 175.3 cm (69.02\")     Body mass index is 34.04 kg/m².    Physical Exam  Vitals reviewed.   HENT:      Head: Normocephalic.   Eyes:      Pupils: Pupils are equal, round, and reactive to light.   Cardiovascular:      Rate and Rhythm: Normal rate and regular rhythm.      Pulses: Normal pulses.   Pulmonary:      Effort: Pulmonary effort is normal.      Breath sounds: Normal breath sounds.   Musculoskeletal:         General: Normal range of motion.      Right lower leg: Edema (trace) present.      Left lower leg: Edema (trace) present.   Skin:     General: Skin is warm and dry. "   Neurological:      Mental Status: She is alert and oriented to person, place, and time.   Psychiatric:         Behavior: Behavior normal.           Current Outpatient Medications:   •  albuterol sulfate  (90 Base) MCG/ACT inhaler, Inhale 2 puffs Every 4 (Four) Hours As Needed for Wheezing., Disp: 1 inhaler, Rfl: 1  •  Estarylla 0.25-35 MG-MCG per tablet, Take 1 tablet by mouth Daily., Disp: , Rfl:   •  fluocinolone acetonide (DERMOTIC) 0.01 % oil otic oil, PLACE 5 DROPS IN RIGHT EAR BID, Disp: , Rfl: 1  •  ibuprofen (ADVIL,MOTRIN) 200 MG tablet, Take 200 mg by mouth every 6 (six) hours as needed for mild pain (1-3)., Disp: , Rfl:   •  metFORMIN ER (Glucophage XR) 500 MG 24 hr tablet, Take 2 tablets by mouth Daily With Breakfast., Disp: 180 tablet, Rfl: 1  •  topiramate (TOPAMAX) 50 MG tablet, Take 50 mg by mouth 3 (Three) Times a Day. anxiety, Disp: , Rfl:   •  Alrex 0.2 % suspension, SHAKE LIQUID AND INSTILL 1 DROP IN BOTH EYES TWICE DAILY, Disp: , Rfl:   •  hydroCHLOROthiazide (HYDRODIURIL) 12.5 MG tablet, Take 1 tablet by mouth Daily., Disp: 30 tablet, Rfl: 0  •  lisinopril (PRINIVIL,ZESTRIL) 5 MG tablet, Take 1 tablet by mouth Daily., Disp: 30 tablet, Rfl: 0  •  Low-Ogestrel 0.3-30 MG-MCG per tablet, Take 1 tablet by mouth Daily., Disp: , Rfl:         Assessment/Plan   Diagnoses and all orders for this visit:    1. Benign essential hypertension (Primary)  -     lisinopril (PRINIVIL,ZESTRIL) 5 MG tablet; Take 1 tablet by mouth Daily.  Dispense: 30 tablet; Refill: 0  -     hydroCHLOROthiazide (HYDRODIURIL) 12.5 MG tablet; Take 1 tablet by mouth Daily.  Dispense: 30 tablet; Refill: 0        Patient Instructions   HTN: pt still will lower b/ps in am, decreasing lisinopril/hctz 10/12.5 daily to lisinopril 5 mg daily and hctz 12.5 mg daily, recommend continuing to check b/p twice a day and keeping log. Pt to rto in 1 month for recheck of b/p and further assessment of pt's dizziness. Pt to continue diet and  exercise as current and making sure to drink at least 64 oz of water daily.      Return for follow up in one month for recheck of b/p.

## 2021-07-12 NOTE — PATIENT INSTRUCTIONS
HTN: pt still will lower b/ps in am, decreasing lisinopril/hctz 10/12.5 daily to lisinopril 5 mg daily and hctz 12.5 mg daily, recommend continuing to check b/p twice a day and keeping log. Pt to rto in 1 month for recheck of b/p and further assessment of pt's dizziness. Pt to continue diet and exercise as current and making sure to drink at least 64 oz of water daily.

## 2021-08-06 DIAGNOSIS — I10 BENIGN ESSENTIAL HYPERTENSION: ICD-10-CM

## 2021-08-06 RX ORDER — LISINOPRIL 5 MG/1
5 TABLET ORAL DAILY
Qty: 30 TABLET | Refills: 0 | Status: SHIPPED | OUTPATIENT
Start: 2021-08-06 | End: 2021-08-11

## 2021-08-06 RX ORDER — HYDROCHLOROTHIAZIDE 12.5 MG/1
12.5 TABLET ORAL DAILY
Qty: 30 TABLET | Refills: 0 | Status: SHIPPED | OUTPATIENT
Start: 2021-08-06 | End: 2021-08-11 | Stop reason: SDUPTHER

## 2021-08-11 ENCOUNTER — OFFICE VISIT (OUTPATIENT)
Dept: FAMILY MEDICINE CLINIC | Facility: CLINIC | Age: 31
End: 2021-08-11

## 2021-08-11 VITALS
BODY MASS INDEX: 33.62 KG/M2 | TEMPERATURE: 97.3 F | OXYGEN SATURATION: 98 % | HEART RATE: 70 BPM | DIASTOLIC BLOOD PRESSURE: 64 MMHG | HEIGHT: 69 IN | SYSTOLIC BLOOD PRESSURE: 114 MMHG | WEIGHT: 227 LBS

## 2021-08-11 DIAGNOSIS — I10 BENIGN ESSENTIAL HYPERTENSION: Primary | ICD-10-CM

## 2021-08-11 PROCEDURE — 99212 OFFICE O/P EST SF 10 MIN: CPT | Performed by: NURSE PRACTITIONER

## 2021-08-11 RX ORDER — LISINOPRIL 2.5 MG/1
2.5 TABLET ORAL DAILY
Qty: 90 TABLET | Refills: 0 | Status: SHIPPED | OUTPATIENT
Start: 2021-08-11 | End: 2021-09-13 | Stop reason: SDUPTHER

## 2021-08-11 RX ORDER — HYDROCHLOROTHIAZIDE 12.5 MG/1
12.5 TABLET ORAL DAILY
Qty: 90 TABLET | Refills: 0 | Status: SHIPPED | OUTPATIENT
Start: 2021-08-11 | End: 2021-09-13 | Stop reason: SDUPTHER

## 2021-08-11 NOTE — PROGRESS NOTES
"Isela Reid is a 31 y.o.. female.     Pt here today for follow up on HTN and dizziness. Pt stating she is feeling less dizzy than before. Pt stating she is checking her blood pressure at home with reading of 102-114/58-69. Pt stating the lower b/p's are usually in the evening. Pt stating she is eating well, drinking plenty of water and walking daily for exercise.       The following portions of the patient's history were reviewed and updated as appropriate: allergies, current medications, past family history, past medical history, past social history, past surgical history and problem list.    Past Medical History:   Diagnosis Date   • Anemia    • Anxiety    • Asthma    • Menstrual problem    • PCOS (polycystic ovarian syndrome)        History reviewed. No pertinent surgical history.    Review of Systems   Constitutional: Negative.    Respiratory: Negative.  Negative for shortness of breath.    Cardiovascular: Negative.  Negative for chest pain, palpitations and leg swelling.   Neurological: Positive for dizziness. Negative for light-headedness and headaches.       Allergies   Allergen Reactions   • Sertraline Palpitations     She had a really fast heart rate after a couple of doses and didn't tolerate a 1/2 dose.          Objective     Vitals:    08/11/21 0859   BP: 114/64   Pulse: 70   Temp: 97.3 °F (36.3 °C)   SpO2: 98%   Weight: 103 kg (227 lb)   Height: 175.3 cm (69\")     Body mass index is 33.52 kg/m².    Physical Exam  Vitals reviewed.   HENT:      Head: Normocephalic.   Eyes:      Pupils: Pupils are equal, round, and reactive to light.   Cardiovascular:      Rate and Rhythm: Normal rate and regular rhythm.      Pulses: Normal pulses.   Pulmonary:      Effort: Pulmonary effort is normal.      Breath sounds: Normal breath sounds.   Musculoskeletal:         General: Normal range of motion.      Right lower leg: Edema (trace) present.      Left lower leg: Edema (trace) present.   Neurological: "      Mental Status: She is alert and oriented to person, place, and time.   Psychiatric:         Behavior: Behavior normal.           Current Outpatient Medications:   •  albuterol sulfate  (90 Base) MCG/ACT inhaler, Inhale 2 puffs Every 4 (Four) Hours As Needed for Wheezing., Disp: 1 inhaler, Rfl: 1  •  Estarylla 0.25-35 MG-MCG per tablet, Take 1 tablet by mouth Daily., Disp: , Rfl:   •  fluocinolone acetonide (DERMOTIC) 0.01 % oil otic oil, PLACE 5 DROPS IN RIGHT EAR BID, Disp: , Rfl: 1  •  hydroCHLOROthiazide (HYDRODIURIL) 12.5 MG tablet, Take 1 tablet by mouth Daily., Disp: 90 tablet, Rfl: 0  •  ibuprofen (ADVIL,MOTRIN) 200 MG tablet, Take 200 mg by mouth every 6 (six) hours as needed for mild pain (1-3)., Disp: , Rfl:   •  Low-Ogestrel 0.3-30 MG-MCG per tablet, Take 1 tablet by mouth Daily., Disp: , Rfl:   •  metFORMIN ER (Glucophage XR) 500 MG 24 hr tablet, Take 2 tablets by mouth Daily With Breakfast., Disp: 180 tablet, Rfl: 1  •  topiramate (TOPAMAX) 50 MG tablet, Take 50 mg by mouth 3 (Three) Times a Day. anxiety, Disp: , Rfl:   •  lisinopril (PRINIVIL,ZESTRIL) 2.5 MG tablet, Take 1 tablet by mouth Daily., Disp: 90 tablet, Rfl: 0        Assessment/Plan   Diagnoses and all orders for this visit:    1. Benign essential hypertension (Primary)  -     lisinopril (PRINIVIL,ZESTRIL) 2.5 MG tablet; Take 1 tablet by mouth Daily.  Dispense: 90 tablet; Refill: 0  -     hydroCHLOROthiazide (HYDRODIURIL) 12.5 MG tablet; Take 1 tablet by mouth Daily.  Dispense: 90 tablet; Refill: 0        Patient Instructions   HTN: decreasing lisinopril from 5 mg to 2.5 mg; will keep HCTZ at 12.5 mg daily; if pt still with some dizziness at follow up appt. With stop HCTZ. Pt to continue monitoring b/p.       Return for follow up in  one month.    Answers for HPI/ROS submitted by the patient on 8/4/2021  Please describe your symptoms.: Adjusting blood pressure nedicine  Have you had these symptoms before?: Yes  How long have you  been having these symptoms?: Greater than 2 weeks  What is the primary reason for your visit?: Other

## 2021-08-16 NOTE — PATIENT INSTRUCTIONS
HTN: decreasing lisinopril from 5 mg to 2.5 mg; will keep HCTZ at 12.5 mg daily; if pt still with some dizziness at follow up appt. With stop HCTZ. Pt to continue monitoring b/p.

## 2021-09-13 ENCOUNTER — OFFICE VISIT (OUTPATIENT)
Dept: FAMILY MEDICINE CLINIC | Facility: CLINIC | Age: 31
End: 2021-09-13

## 2021-09-13 DIAGNOSIS — I10 BENIGN ESSENTIAL HYPERTENSION: ICD-10-CM

## 2021-09-13 PROCEDURE — 99213 OFFICE O/P EST LOW 20 MIN: CPT | Performed by: NURSE PRACTITIONER

## 2021-09-13 RX ORDER — LISINOPRIL 2.5 MG/1
2.5 TABLET ORAL DAILY
Qty: 90 TABLET | Refills: 1 | Status: SHIPPED | OUTPATIENT
Start: 2021-09-13 | End: 2022-03-22 | Stop reason: SDUPTHER

## 2021-09-13 RX ORDER — HYDROCHLOROTHIAZIDE 12.5 MG/1
12.5 TABLET ORAL DAILY
Qty: 90 TABLET | Refills: 1 | Status: SHIPPED | OUTPATIENT
Start: 2021-09-13 | End: 2022-03-22 | Stop reason: SDUPTHER

## 2021-09-13 NOTE — PROGRESS NOTES
"Isela Reid is a 31 y.o.. female.     Pt here today for follow up on HTN. Pt stating she has been checking her blood pressure at home with average around 110's/70's. Pt denies dizziness and states she very rarely has any light headedness.      The following portions of the patient's history were reviewed and updated as appropriate: allergies, current medications, past family history, past medical history, past social history, past surgical history and problem list.    Past Medical History:   Diagnosis Date   • Anemia    • Anxiety    • Asthma    • Menstrual problem    • PCOS (polycystic ovarian syndrome)        History reviewed. No pertinent surgical history.    Review of Systems   Constitutional: Negative.    Respiratory: Negative.  Negative for shortness of breath.    Cardiovascular: Negative.  Negative for chest pain and palpitations.   Gastrointestinal: Negative.  Negative for nausea.   Neurological: Positive for light-headedness (rarely). Negative for dizziness and headaches.       Allergies   Allergen Reactions   • Sertraline Palpitations     She had a really fast heart rate after a couple of doses and didn't tolerate a 1/2 dose.          Objective     Vitals:    09/13/21 0901 09/15/21 1609   BP: 112/74    Pulse: 99    Temp: 97.6 °F (36.4 °C)    SpO2: (!) 75% 97%   Weight: 102 kg (224 lb 9.6 oz)    Height: 175.3 cm (69\")      Body mass index is 33.17 kg/m².    Physical Exam  Vitals reviewed.   HENT:      Head: Normocephalic.   Eyes:      Pupils: Pupils are equal, round, and reactive to light.   Neck:      Vascular: No carotid bruit.   Cardiovascular:      Rate and Rhythm: Normal rate and regular rhythm.   Pulmonary:      Effort: Pulmonary effort is normal.      Breath sounds: Normal breath sounds.   Musculoskeletal:         General: Normal range of motion.      Right lower leg: Edema (trace) present.      Left lower leg: Edema (trace) present.   Neurological:      Mental Status: She is alert " and oriented to person, place, and time.   Psychiatric:         Behavior: Behavior normal.           Current Outpatient Medications:   •  hydroCHLOROthiazide (HYDRODIURIL) 12.5 MG tablet, Take 1 tablet by mouth Daily., Disp: 90 tablet, Rfl: 1  •  ibuprofen (ADVIL,MOTRIN) 200 MG tablet, Take 200 mg by mouth every 6 (six) hours as needed for mild pain (1-3)., Disp: , Rfl:   •  lisinopril (PRINIVIL,ZESTRIL) 2.5 MG tablet, Take 1 tablet by mouth Daily., Disp: 90 tablet, Rfl: 1  •  Low-Ogestrel 0.3-30 MG-MCG per tablet, Take 1 tablet by mouth Daily., Disp: , Rfl:   •  metFORMIN ER (Glucophage XR) 500 MG 24 hr tablet, Take 2 tablets by mouth Daily With Breakfast., Disp: 180 tablet, Rfl: 1  •  topiramate (TOPAMAX) 50 MG tablet, Take 50 mg by mouth 3 (Three) Times a Day. anxiety, Disp: , Rfl:       Assessment/Plan   Diagnoses and all orders for this visit:    1. Benign essential hypertension  -     hydroCHLOROthiazide (HYDRODIURIL) 12.5 MG tablet; Take 1 tablet by mouth Daily.  Dispense: 90 tablet; Refill: 1  -     lisinopril (PRINIVIL,ZESTRIL) 2.5 MG tablet; Take 1 tablet by mouth Daily.  Dispense: 90 tablet; Refill: 1        Patient Instructions   HTN: stable, continue hctz 12.5 mg daily and lisinopril 2.5 mg daily; continue to eat a heart healthy diet, drink plenty of fluids and exercise.       Return for follow up in 6 months for fasting labs and physical.

## 2021-09-15 VITALS
WEIGHT: 224.6 LBS | OXYGEN SATURATION: 97 % | SYSTOLIC BLOOD PRESSURE: 112 MMHG | BODY MASS INDEX: 33.27 KG/M2 | DIASTOLIC BLOOD PRESSURE: 74 MMHG | HEIGHT: 69 IN | HEART RATE: 99 BPM | TEMPERATURE: 97.6 F

## 2021-09-15 NOTE — PATIENT INSTRUCTIONS
HTN: stable, continue hctz 12.5 mg daily and lisinopril 2.5 mg daily; continue to eat a heart healthy diet, drink plenty of fluids and exercise.

## 2021-12-14 DIAGNOSIS — E28.2 PCOS (POLYCYSTIC OVARIAN SYNDROME): ICD-10-CM

## 2021-12-14 NOTE — TELEPHONE ENCOUNTER
Caller: Windy Reid    Relationship: Self    Best call back number: [phone] (H)    Requested Prescriptions:   Requested Prescriptions     Pending Prescriptions Disp Refills   • metFORMIN ER (Glucophage XR) 500 MG 24 hr tablet 180 tablet 1     Sig: Take 2 tablets by mouth Daily With Breakfast.        Pharmacy where request should be sent:    [id number] - [place], [place] - [address] [address] AT [facility] [address]  - [phone]  - [phone] FX     Does the patient have less than a 3 day supply:  [] Yes  [x] No    Nitesh Rodriguez Rep   12/14/21 10:55 EST

## 2021-12-15 RX ORDER — METFORMIN HYDROCHLORIDE 500 MG/1
1000 TABLET, EXTENDED RELEASE ORAL
Qty: 60 TABLET | Refills: 0 | Status: SHIPPED | OUTPATIENT
Start: 2021-12-15 | End: 2022-03-07

## 2021-12-15 NOTE — TELEPHONE ENCOUNTER
Rx Refill Note  Requested Prescriptions     Pending Prescriptions Disp Refills   • metFORMIN ER (Glucophage XR) 500 MG 24 hr tablet 180 tablet 1     Sig: Take 2 tablets by mouth Daily With Breakfast.      Last office visit with prescribing clinician: 9/13/2021      Next office visit with prescribing clinician: 3/22/2022            Hollie Gotti MA  12/15/21, 07:59 EST

## 2022-03-04 DIAGNOSIS — E28.2 PCOS (POLYCYSTIC OVARIAN SYNDROME): ICD-10-CM

## 2022-03-07 RX ORDER — METFORMIN HYDROCHLORIDE 500 MG/1
1000 TABLET, EXTENDED RELEASE ORAL
Qty: 60 TABLET | Refills: 0 | Status: SHIPPED | OUTPATIENT
Start: 2022-03-07 | End: 2022-03-22 | Stop reason: SDUPTHER

## 2022-03-07 NOTE — TELEPHONE ENCOUNTER
Vivian,    Please review and refill if appropriate. Let me know if anything additional is needed.    Thanks,  Ld    Next OV 03/22/2022  Last OV 09/13/2021

## 2022-03-14 DIAGNOSIS — F41.9 ANXIETY: ICD-10-CM

## 2022-03-14 DIAGNOSIS — Z00.00 PHYSICAL EXAM: Primary | ICD-10-CM

## 2022-03-14 DIAGNOSIS — E66.01 MORBID OBESITY WITH BMI OF 40.0-44.9, ADULT: ICD-10-CM

## 2022-03-14 DIAGNOSIS — I10 BENIGN ESSENTIAL HYPERTENSION: ICD-10-CM

## 2022-03-16 LAB
ALBUMIN SERPL-MCNC: 4.3 G/DL (ref 3.8–4.8)
ALBUMIN/GLOB SERPL: 1.5 {RATIO} (ref 1.2–2.2)
ALP SERPL-CCNC: 49 IU/L (ref 44–121)
ALT SERPL-CCNC: 9 IU/L (ref 0–32)
APPEARANCE UR: CLEAR
AST SERPL-CCNC: 11 IU/L (ref 0–40)
BACTERIA #/AREA URNS HPF: ABNORMAL /[HPF]
BASOPHILS # BLD AUTO: 0.1 X10E3/UL (ref 0–0.2)
BASOPHILS NFR BLD AUTO: 1 %
BILIRUB SERPL-MCNC: 0.2 MG/DL (ref 0–1.2)
BILIRUB UR QL STRIP: NEGATIVE
BUN SERPL-MCNC: 17 MG/DL (ref 6–20)
BUN/CREAT SERPL: 20 (ref 9–23)
CALCIUM SERPL-MCNC: 9.1 MG/DL (ref 8.7–10.2)
CASTS URNS QL MICRO: ABNORMAL /LPF
CHLORIDE SERPL-SCNC: 104 MMOL/L (ref 96–106)
CHOLEST SERPL-MCNC: 177 MG/DL (ref 100–199)
CO2 SERPL-SCNC: 19 MMOL/L (ref 20–29)
COLOR UR: YELLOW
CREAT SERPL-MCNC: 0.86 MG/DL (ref 0.57–1)
CRYSTALS URNS MICRO: ABNORMAL
EGFRCR SERPLBLD CKD-EPI 2021: 91 ML/MIN/1.73
EOSINOPHIL # BLD AUTO: 0.6 X10E3/UL (ref 0–0.4)
EOSINOPHIL NFR BLD AUTO: 6 %
EPI CELLS #/AREA URNS HPF: ABNORMAL /HPF (ref 0–10)
ERYTHROCYTE [DISTWIDTH] IN BLOOD BY AUTOMATED COUNT: 13 % (ref 11.7–15.4)
GLOBULIN SER CALC-MCNC: 2.8 G/DL (ref 1.5–4.5)
GLUCOSE SERPL-MCNC: 89 MG/DL (ref 65–99)
GLUCOSE UR QL STRIP: NEGATIVE
HBA1C MFR BLD: 5.5 % (ref 4.8–5.6)
HCT VFR BLD AUTO: 40.5 % (ref 34–46.6)
HDLC SERPL-MCNC: 60 MG/DL
HGB BLD-MCNC: 13.2 G/DL (ref 11.1–15.9)
HGB UR QL STRIP: NEGATIVE
IMM GRANULOCYTES # BLD AUTO: 0 X10E3/UL (ref 0–0.1)
IMM GRANULOCYTES NFR BLD AUTO: 0 %
KETONES UR QL STRIP: NEGATIVE
LDLC SERPL CALC-MCNC: 100 MG/DL (ref 0–99)
LDLC/HDLC SERPL: 1.7 RATIO (ref 0–3.2)
LEUKOCYTE ESTERASE UR QL STRIP: ABNORMAL
LYMPHOCYTES # BLD AUTO: 3.4 X10E3/UL (ref 0.7–3.1)
LYMPHOCYTES NFR BLD AUTO: 33 %
MCH RBC QN AUTO: 26.5 PG (ref 26.6–33)
MCHC RBC AUTO-ENTMCNC: 32.6 G/DL (ref 31.5–35.7)
MCV RBC AUTO: 81 FL (ref 79–97)
MICRO URNS: ABNORMAL
MONOCYTES # BLD AUTO: 0.6 X10E3/UL (ref 0.1–0.9)
MONOCYTES NFR BLD AUTO: 6 %
MUCOUS THREADS URNS QL MICRO: PRESENT
NEUTROPHILS # BLD AUTO: 5.7 X10E3/UL (ref 1.4–7)
NEUTROPHILS NFR BLD AUTO: 54 %
NITRITE UR QL STRIP: NEGATIVE
PH UR STRIP: 6 [PH] (ref 5–7.5)
PLATELET # BLD AUTO: 300 X10E3/UL (ref 150–450)
POTASSIUM SERPL-SCNC: 4.3 MMOL/L (ref 3.5–5.2)
PROT SERPL-MCNC: 7.1 G/DL (ref 6–8.5)
PROT UR QL STRIP: NEGATIVE
RBC # BLD AUTO: 4.99 X10E6/UL (ref 3.77–5.28)
RBC #/AREA URNS HPF: ABNORMAL /HPF (ref 0–2)
SODIUM SERPL-SCNC: 139 MMOL/L (ref 134–144)
SP GR UR STRIP: 1.02 (ref 1–1.03)
T4 FREE SERPL-MCNC: 1.18 NG/DL (ref 0.82–1.77)
TRIGL SERPL-MCNC: 96 MG/DL (ref 0–149)
TSH SERPL DL<=0.005 MIU/L-ACNC: 1.7 UIU/ML (ref 0.45–4.5)
UNIDENT CRYS URNS QL MICRO: PRESENT
UROBILINOGEN UR STRIP-MCNC: 0.2 MG/DL (ref 0.2–1)
VLDLC SERPL CALC-MCNC: 17 MG/DL (ref 5–40)
WBC # BLD AUTO: 10.5 X10E3/UL (ref 3.4–10.8)
WBC #/AREA URNS HPF: ABNORMAL /HPF (ref 0–5)

## 2022-03-22 ENCOUNTER — OFFICE VISIT (OUTPATIENT)
Dept: FAMILY MEDICINE CLINIC | Facility: CLINIC | Age: 32
End: 2022-03-22

## 2022-03-22 VITALS
BODY MASS INDEX: 33.62 KG/M2 | SYSTOLIC BLOOD PRESSURE: 120 MMHG | DIASTOLIC BLOOD PRESSURE: 76 MMHG | HEIGHT: 69 IN | TEMPERATURE: 96.9 F | OXYGEN SATURATION: 97 % | HEART RATE: 84 BPM | WEIGHT: 227 LBS | RESPIRATION RATE: 18 BRPM

## 2022-03-22 DIAGNOSIS — E28.2 PCOS (POLYCYSTIC OVARIAN SYNDROME): ICD-10-CM

## 2022-03-22 DIAGNOSIS — I10 BENIGN ESSENTIAL HYPERTENSION: ICD-10-CM

## 2022-03-22 DIAGNOSIS — Z00.00 ANNUAL PHYSICAL EXAM: Primary | ICD-10-CM
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
## 2022-03-22 PROCEDURE — 93000 ELECTROCARDIOGRAM COMPLETE: CPT | Performed by: NURSE PRACTITIONER

## 2022-03-22 PROCEDURE — 99395 PREV VISIT EST AGE 18-39: CPT | Performed by: NURSE PRACTITIONER

## 2022-03-22 RX ORDER — METFORMIN HYDROCHLORIDE 500 MG/1
1000 TABLET, EXTENDED RELEASE ORAL
Qty: 180 TABLET | Refills: 1 | Status: SHIPPED | OUTPATIENT
Start: 2022-03-22

## 2022-03-22 RX ORDER — LISINOPRIL 2.5 MG/1
2.5 TABLET ORAL DAILY
Qty: 90 TABLET | Refills: 1 | Status: SHIPPED | OUTPATIENT
Start: 2022-03-22 | End: 2022-08-16 | Stop reason: SDUPTHER

## 2022-03-22 RX ORDER — TOPIRAMATE 100 MG/1
100 TABLET, FILM COATED ORAL 2 TIMES DAILY
COMMUNITY
Start: 2022-03-04

## 2022-03-22 RX ORDER — HYDROCHLOROTHIAZIDE 12.5 MG/1
12.5 TABLET ORAL DAILY
Qty: 90 TABLET | Refills: 1 | Status: SHIPPED | OUTPATIENT
Start: 2022-03-22 | End: 2022-08-16 | Stop reason: SDUPTHER

## 2022-03-22 NOTE — PROGRESS NOTES
Subjective   Windy Reid is a 31 y.o. female. Patient is here today for   Chief Complaint   Patient presents with   • Annual Exam     PT HERE FOR CPE AND PT RECENTLY HAD A STOMACH VIRUS AND SHE PASSED OUT 3 TIMES- 2 OF THE 3 TIMES SHE HIT HEAD- WAS ON Sunday           Vitals:    03/22/22 0828   BP: 120/76   Pulse: 84   Resp: 18   Temp: 96.9 °F (36.1 °C)   SpO2: 97%     Body mass index is 33.51 kg/m².    The following portions of the patient's history were reviewed and updated as appropriate: allergies, current medications, past family history, past medical history, past social history, past surgical history and problem list.    Past Medical History:   Diagnosis Date   • Anemia    • Anxiety    • Asthma    • Menstrual problem    • PCOS (polycystic ovarian syndrome)       Allergies   Allergen Reactions   • Sertraline Palpitations     She had a really fast heart rate after a couple of doses and didn't tolerate a 1/2 dose.         Social History     Socioeconomic History   • Marital status: Single   Tobacco Use   • Smoking status: Never Smoker   • Smokeless tobacco: Never Used   Substance and Sexual Activity   • Alcohol use: Yes     Comment: 1 glass of wine once a month   • Drug use: No   • Sexual activity: Defer     Partners: Male     Birth control/protection: OCP     Comment: bcp        Current Outpatient Medications:   •  hydroCHLOROthiazide (HYDRODIURIL) 12.5 MG tablet, Take 1 tablet by mouth Daily., Disp: 90 tablet, Rfl: 1  •  lisinopril (PRINIVIL,ZESTRIL) 2.5 MG tablet, Take 1 tablet by mouth Daily., Disp: 90 tablet, Rfl: 1  •  metFORMIN ER (GLUCOPHAGE-XR) 500 MG 24 hr tablet, Take 2 tablets by mouth Daily With Breakfast., Disp: 180 tablet, Rfl: 1  •  ibuprofen (ADVIL,MOTRIN) 200 MG tablet, Take 200 mg by mouth every 6 (six) hours as needed for mild pain (1-3)., Disp: , Rfl:   •  Low-Ogestrel 0.3-30 MG-MCG per tablet, Take 1 tablet by mouth Daily., Disp: , Rfl:   •  topiramate (TOPAMAX) 100 MG tablet, Take 100 mg  by mouth 2 (Two) Times a Day., Disp: , Rfl:      EKG in office 08:32: SR, vent rate 75, OH int 160, QRS 96    Objective   Pt here today for her annual physical.    Pt stating she passed out on Sunday 3 times while dealing with a stomach virus. Pt stating when she passed out she hit her head on corner of wall, wall and then floor. Pt stating she did not check b/p during that time period. Pt stating she had one full day of diarrhea. Pt denies any vomiting but had nausea. Pt stating in the past 24 hours she has not had any nausea, vomiting, blurry vision, double vision, urinary issues, behavior changes, and/or no gait changes. Pt stating she had a headache last night but it resolved on its own and no headache today. Pt stating she had been checking b/p at home for her HTN previous to this and getting averages of 120/60-70's.     Pt stating she sees Dr. NESTOR Verma, GYN at [facility]; last pap-6/2021 wnl per pt     Windy Reid 31 y.o. female who presents for an Annual Wellness Visit.  she has a history of   Patient Active Problem List   Diagnosis   • Asthma   • Anxiety   • PCOS (polycystic ovarian syndrome)   • Morbid obesity with BMI of 40.0-44.9, adult (HCC)   • Benign essential hypertension   • Leukocytosis   .  she has been doing well with new interval problems.  Labs results discussed in detail with the patient.  Plan to update vaccines if needed today.    Health Habits:  Dental Exam. up to date; last week  Eye Exam. up to date; 1 month ago  Exercise: 3 times/week.  Current exercise activities include: aerobics, walking, swimming  Diet: heart healthy, drinks some water, does eat cheese alot    Recent Results (from the past 336 hour(s))   CBC & Differential    Collection Time: 03/15/22  8:32 AM    Specimen: Blood   Result Value Ref Range    WBC 10.5 3.4 - 10.8 x10E3/uL    RBC 4.99 3.77 - 5.28 x10E6/uL    Hemoglobin 13.2 11.1 - 15.9 g/dL    Hematocrit 40.5 34.0 - 46.6 %    MCV 81 79 - 97 fL    MCH 26.5 (L) 26.6 -  33.0 pg    MCHC 32.6 31.5 - 35.7 g/dL    RDW 13.0 11.7 - 15.4 %    Platelets 300 150 - 450 x10E3/uL    Neutrophil Rel % 54 Not Estab. %    Lymphocyte Rel % 33 Not Estab. %    Monocyte Rel % 6 Not Estab. %    Eosinophil Rel % 6 Not Estab. %    Basophil Rel % 1 Not Estab. %    Neutrophils Absolute 5.7 1.4 - 7.0 x10E3/uL    Lymphocytes Absolute 3.4 (H) 0.7 - 3.1 x10E3/uL    Monocytes Absolute 0.6 0.1 - 0.9 x10E3/uL    Eosinophils Absolute 0.6 (H) 0.0 - 0.4 x10E3/uL    Basophils Absolute 0.1 0.0 - 0.2 x10E3/uL    Immature Granulocyte Rel % 0 Not Estab. %    Immature Grans Absolute 0.0 0.0 - 0.1 x10E3/uL   Comprehensive Metabolic Panel    Collection Time: 03/15/22  8:32 AM    Specimen: Blood   Result Value Ref Range    Glucose 89 65 - 99 mg/dL    BUN 17 6 - 20 mg/dL    Creatinine 0.86 0.57 - 1.00 mg/dL    EGFR Result 91 >59 mL/min/1.73    BUN/Creatinine Ratio 20 9 - 23    Sodium 139 134 - 144 mmol/L    Potassium 4.3 3.5 - 5.2 mmol/L    Chloride 104 96 - 106 mmol/L    Total CO2 19 (L) 20 - 29 mmol/L    Calcium 9.1 8.7 - 10.2 mg/dL    Total Protein 7.1 6.0 - 8.5 g/dL    Albumin 4.3 3.8 - 4.8 g/dL    Globulin 2.8 1.5 - 4.5 g/dL    A/G Ratio 1.5 1.2 - 2.2    Total Bilirubin 0.2 0.0 - 1.2 mg/dL    Alkaline Phosphatase 49 44 - 121 IU/L    AST (SGOT) 11 0 - 40 IU/L    ALT (SGPT) 9 0 - 32 IU/L   Lipid Panel With LDL / HDL Ratio    Collection Time: 03/15/22  8:32 AM    Specimen: Blood   Result Value Ref Range    Total Cholesterol 177 100 - 199 mg/dL    Triglycerides 96 0 - 149 mg/dL    HDL Cholesterol 60 >39 mg/dL    VLDL Cholesterol [place] 17 5 - 40 mg/dL    LDL Chol Calc (NIH) 100 (H) 0 - 99 mg/dL    LDL/HDL RATIO 1.7 0.0 - 3.2 ratio   Hemoglobin A1c    Collection Time: 03/15/22  8:32 AM    Specimen: Blood   Result Value Ref Range    Hemoglobin A1C 5.5 4.8 - 5.6 %   Urinalysis With Microscopic - Urine, Clean Catch    Collection Time: 03/15/22  8:32 AM    Specimen: Urine, Clean Catch   Result Value Ref Range    Specific Gravity, UA  1.022 1.005 - 1.030    pH, UA 6.0 5.0 - 7.5    Color, UA Yellow Yellow    Appearance, UA Clear Clear    Leukocytes, UA 1+ (A) Negative    Protein Negative Negative/Trace    Glucose, UA Negative Negative    Ketones Negative Negative    Blood, UA Negative Negative    Bilirubin, UA Negative Negative    Urobilinogen, UA 0.2 0.2 - 1.0 mg/dL    Nitrite, UA Negative Negative    Microscopic Examination See below:    TSH    Collection Time: 03/15/22  8:32 AM    Specimen: Blood   Result Value Ref Range    TSH 1.700 0.450 - 4.500 uIU/mL   T4, free    Collection Time: 03/15/22  8:32 AM    Specimen: Blood   Result Value Ref Range    Free T4 1.18 0.82 - 1.77 ng/dL   Microscopic Examination -    Collection Time: 03/15/22  8:32 AM   Result Value Ref Range    WBC, UA 0-5 0 - 5 /hpf    RBC, UA None seen 0 - 2 /hpf    Epithelial Cells (non renal) 0-10 0 - 10 /hpf    Casts None seen None seen /lpf    Crystals, UA Present (A) N/A    Crystal Type Calcium Oxalate N/A    Mucus, UA Present Not Estab.    Bacteria, UA Few None seen/Few            Review of Systems   Eyes: Negative for visual disturbance.   Respiratory: Negative for shortness of breath.    Cardiovascular: Negative for chest pain.   Gastrointestinal: Negative for nausea and vomiting.   Genitourinary: Negative.    Musculoskeletal: Negative for gait problem.   Neurological: Positive for headaches (last night, resolved; none today).   Psychiatric/Behavioral: Negative for agitation and confusion.       Physical Exam  Constitutional:       Appearance: Normal appearance. She is well-developed.   HENT:      Head: Normocephalic.      Comments: Small abrasion to left forehead     Right Ear: Tympanic membrane normal. Tympanic membrane is not erythematous.      Left Ear: Tympanic membrane normal. Tympanic membrane is not erythematous.      Nose: Nose normal.   Eyes:      Extraocular Movements:      Right eye: Normal extraocular motion and no nystagmus.      Left eye: Normal extraocular  motion and no nystagmus.      Conjunctiva/sclera: Conjunctivae normal.      Pupils: Pupils are equal, round, and reactive to light.   Neck:      Thyroid: No thyromegaly.      Vascular: No carotid bruit.      Trachea: No tracheal deviation.   Cardiovascular:      Rate and Rhythm: Normal rate and regular rhythm.      Pulses: Normal pulses.      Heart sounds: Normal heart sounds. No murmur heard.  Pulmonary:      Effort: No accessory muscle usage or respiratory distress.      Breath sounds: Normal breath sounds. No stridor. No decreased breath sounds, wheezing, rhonchi or rales.   Abdominal:      General: Bowel sounds are normal. There is no distension.      Palpations: Abdomen is soft. Abdomen is not rigid. There is no mass.      Tenderness: There is no abdominal tenderness. There is no guarding or rebound.      Hernia: No hernia is present.   Musculoskeletal:         General: Normal range of motion.      Cervical back: Normal range of motion and neck supple.   Lymphadenopathy:      Cervical: No cervical adenopathy.   Skin:     General: Skin is warm and dry.   Neurological:      Mental Status: She is alert and oriented to person, place, and time.      Cranial Nerves: No cranial nerve deficit, dysarthria or facial asymmetry.      Sensory: No sensory deficit.      Motor: Motor function is intact. No abnormal muscle tone.      Coordination: Coordination is intact. Coordination normal.      Gait: Gait normal.   Psychiatric:         Mood and Affect: Mood normal.         Speech: Speech normal.         Behavior: Behavior normal. Behavior is cooperative.         ASSESSMENT       Problems Addressed this Visit        Cardiac and Vasculature    Benign essential hypertension    Relevant Medications    hydroCHLOROthiazide (HYDRODIURIL) 12.5 MG tablet    lisinopril (PRINIVIL,ZESTRIL) 2.5 MG tablet    Other Relevant Orders    ECG 12 Lead (Completed)       Endocrine and Metabolic    PCOS (polycystic ovarian syndrome)    Relevant  Medications    metFORMIN ER (GLUCOPHAGE-XR) 500 MG 24 hr tablet      Other Visit Diagnoses     Annual physical exam    -  Primary    Fall, initial encounter          Diagnoses       Codes Comments    Annual physical exam    -  Primary ICD-10-CM: Z00.00  ICD-9-CM: V70.0     Benign essential hypertension     ICD-10-CM: I10  ICD-9-CM: 401.1     PCOS (polycystic ovarian syndrome)     ICD-10-CM: E28.2  ICD-9-CM: 256.4     Fall, initial encounter     ICD-10-CM: W19.XXXA  ICD-9-CM: E888.9           PLAN    Patient Instructions   HTN: stable, continue HCTZ 12.5 mg 1 tab daily, lisinopril 2.5 mg 1 tab daily; continue to eat a heart healthy diet-work on decreasing cheese intake or go to low fat cheese, drink plenty of water with goal 64 oz a day and continue current exercise routine.    PCOS: continue metformin  mg 2 tabs daily, follow up with Dr. NESTOR Verma, GYN at [facility]    Fall: Discussed in detail, pt not having any symptoms at this time, pt informed to monitor for headaches, nausea, vomiting, lightheadedness, dizziness, mood changes, level of consciousness changes, blurry vision and/or double vision to call/rto. Pt verb. Understanding. Discussed importance of staying hydrated. Pt verb. Understanding.       Return for 6 months for recheck of HTN.

## 2022-03-22 NOTE — PATIENT INSTRUCTIONS
HTN: stable, continue HCTZ 12.5 mg 1 tab daily, lisinopril 2.5 mg 1 tab daily; continue to eat a heart healthy diet-work on decreasing cheese intake or go to low fat cheese, drink plenty of water with goal 64 oz a day and continue current exercise routine.    PCOS: continue metformin  mg 2 tabs daily, follow up with Dr. NESTOR Verma, GYN at [facility]    Fall: Discussed in detail, pt not having any symptoms at this time, pt informed to monitor for headaches, nausea, vomiting, lightheadedness, dizziness, mood changes, level of consciousness changes, blurry vision and/or double vision to call/rto. Pt verb. Understanding. Discussed importance of staying hydrated. Pt verb. Understanding.

## 2022-08-16 ENCOUNTER — OFFICE VISIT (OUTPATIENT)
Dept: FAMILY MEDICINE CLINIC | Facility: CLINIC | Age: 32
End: 2022-08-16

## 2022-08-16 VITALS
WEIGHT: 232.4 LBS | DIASTOLIC BLOOD PRESSURE: 70 MMHG | OXYGEN SATURATION: 98 % | BODY MASS INDEX: 34.42 KG/M2 | SYSTOLIC BLOOD PRESSURE: 126 MMHG | HEIGHT: 69 IN | TEMPERATURE: 97.3 F | HEART RATE: 86 BPM

## 2022-08-16 DIAGNOSIS — I10 BENIGN ESSENTIAL HYPERTENSION: Primary | ICD-10-CM

## 2022-08-16 DIAGNOSIS — J45.20 MILD INTERMITTENT ASTHMA, UNSPECIFIED WHETHER COMPLICATED: ICD-10-CM

## 2022-08-16 PROCEDURE — 99213 OFFICE O/P EST LOW 20 MIN: CPT | Performed by: NURSE PRACTITIONER

## 2022-08-16 RX ORDER — FLUTICASONE PROPIONATE AND SALMETEROL XINAFOATE 115; 21 UG/1; UG/1
2 AEROSOL, METERED RESPIRATORY (INHALATION)
Qty: 12 G | Refills: 2 | Status: SHIPPED | OUTPATIENT
Start: 2022-08-16

## 2022-08-16 RX ORDER — MONTELUKAST SODIUM 10 MG/1
10 TABLET ORAL NIGHTLY
Qty: 30 TABLET | Refills: 2 | Status: SHIPPED | OUTPATIENT
Start: 2022-08-16

## 2022-08-16 RX ORDER — LISINOPRIL 2.5 MG/1
2.5 TABLET ORAL DAILY
Qty: 90 TABLET | Refills: 1 | Status: SHIPPED | OUTPATIENT
Start: 2022-08-16 | End: 2023-04-03

## 2022-08-16 RX ORDER — ALBUTEROL SULFATE 90 UG/1
2 AEROSOL, METERED RESPIRATORY (INHALATION) EVERY 4 HOURS PRN
Qty: 18 G | Refills: 2 | Status: SHIPPED | OUTPATIENT
Start: 2022-08-16

## 2022-08-16 RX ORDER — HYDROCHLOROTHIAZIDE 12.5 MG/1
12.5 TABLET ORAL DAILY
Qty: 90 TABLET | Refills: 1 | Status: SHIPPED | OUTPATIENT
Start: 2022-08-16

## 2022-08-16 NOTE — PROGRESS NOTES
"Subjective     Windy Reid is a 32 y.o.. female.     Pt here today for follow up on HTN and asthma.    Pt stating she got covid around 4th of July weekend and since then she has been having issues with her asthma. Pt stating her asthma flair ups have been off and on since covid. Pt stating she has been using a rescue inhaler more frequently. Pt stating she has cough, wheezing and shortness of breath at times.     Pt stating she checks her B/p at home and averages 110-120/65-70's. Pt stating she is eating healthy, drinking more water, and walking her dog daily. Pt admits to eating a high sodium dinner last night.    Pt stating she feels like her anxiety is good and states she sees Dr. Santoro for treatment.        The following portions of the patient's history were reviewed and updated as appropriate: allergies, current medications, past family history, past medical history, past social history, past surgical history and problem list.    Past Medical History:   Diagnosis Date   • Anemia    • Anxiety    • Asthma    • Menstrual problem    • PCOS (polycystic ovarian syndrome)        No past surgical history on file.    Review of Systems   Constitutional: Negative for fever.   HENT: Negative.    Respiratory: Positive for cough, shortness of breath and wheezing.    Cardiovascular: Negative.    Gastrointestinal: Negative.    Neurological: Negative.    Psychiatric/Behavioral: Negative for sleep disturbance. The patient is not nervous/anxious.        Allergies   Allergen Reactions   • Sertraline Palpitations     She had a really fast heart rate after a couple of doses and didn't tolerate a 1/2 dose.          Objective     Vitals:    08/16/22 1503 08/16/22 1518   BP: 131/80 126/70   BP Location: Left arm    Patient Position: Sitting    Cuff Size: Adult    Pulse: 86    Temp: 97.3 °F (36.3 °C)    TempSrc: Temporal    SpO2: 98%    Weight: 105 kg (232 lb 6.4 oz)    Height: 175.3 cm (69.02\")      Body mass index is 34.3 " kg/m².    Physical Exam  Vitals reviewed.   HENT:      Head: Normocephalic.   Eyes:      Pupils: Pupils are equal, round, and reactive to light.   Neck:      Vascular: No carotid bruit.   Cardiovascular:      Rate and Rhythm: Normal rate and regular rhythm.      Pulses: Normal pulses.   Pulmonary:      Effort: No accessory muscle usage or respiratory distress.      Breath sounds: No stridor. Examination of the right-upper field reveals wheezing. Examination of the left-upper field reveals wheezing. Examination of the right-middle field reveals wheezing. Wheezing present. No rhonchi or rales.   Chest:      Chest wall: No tenderness.   Musculoskeletal:      Right lower leg: Edema (trace non pitting) present.      Left lower leg: Edema (trace non pitting) present.   Neurological:      Mental Status: She is alert.           Current Outpatient Medications:   •  hydroCHLOROthiazide (HYDRODIURIL) 12.5 MG tablet, Take 1 tablet by mouth Daily., Disp: 90 tablet, Rfl: 1  •  ibuprofen (ADVIL,MOTRIN) 200 MG tablet, Take 200 mg by mouth every 6 (six) hours as needed for mild pain (1-3)., Disp: , Rfl:   •  lisinopril (PRINIVIL,ZESTRIL) 2.5 MG tablet, Take 1 tablet by mouth Daily., Disp: 90 tablet, Rfl: 1  •  Low-Ogestrel 0.3-30 MG-MCG per tablet, Take 1 tablet by mouth Daily., Disp: , Rfl:   •  metFORMIN ER (GLUCOPHAGE-XR) 500 MG 24 hr tablet, Take 2 tablets by mouth Daily With Breakfast., Disp: 180 tablet, Rfl: 1  •  topiramate (TOPAMAX) 100 MG tablet, Take 100 mg by mouth 2 (Two) Times a Day., Disp: , Rfl:   •  albuterol sulfate  (90 Base) MCG/ACT inhaler, Inhale 2 puffs Every 4 (Four) Hours As Needed for Wheezing or Shortness of Air (coughing episodes)., Disp: 18 g, Rfl: 2  •  fluticasone-salmeterol (Advair HFA) 115-21 MCG/ACT inhaler, Inhale 2 puffs 2 (Two) Times a Day., Disp: 12 g, Rfl: 2  •  montelukast (Singulair) 10 MG tablet, Take 1 tablet by mouth Every Night., Disp: 30 tablet, Rfl: 2        Diagnoses and all  orders for this visit:    1. Benign essential hypertension (Primary)  -     hydroCHLOROthiazide (HYDRODIURIL) 12.5 MG tablet; Take 1 tablet by mouth Daily.  Dispense: 90 tablet; Refill: 1  -     lisinopril (PRINIVIL,ZESTRIL) 2.5 MG tablet; Take 1 tablet by mouth Daily.  Dispense: 90 tablet; Refill: 1    2. Mild intermittent asthma, unspecified whether complicated  -     montelukast (Singulair) 10 MG tablet; Take 1 tablet by mouth Every Night.  Dispense: 30 tablet; Refill: 2  -     fluticasone-salmeterol (Advair HFA) 115-21 MCG/ACT inhaler; Inhale 2 puffs 2 (Two) Times a Day.  Dispense: 12 g; Refill: 2  -     albuterol sulfate  (90 Base) MCG/ACT inhaler; Inhale 2 puffs Every 4 (Four) Hours As Needed for Wheezing or Shortness of Air (coughing episodes).  Dispense: 18 g; Refill: 2        Patient Instructions   HTN: stable, continue HCTZ 12.5 mg 1 tab daily, lisinopril 2.5 mg 1 tab daily. Continue to eat a heart healthy diet, drink plenty of water with goal 64 oz a day and to exercise 3-5 times a week, for more than 30 minutes at a time     Asthma: starting singulair 10 mg daily, advair /21 2 puffs twice a day and albuterol inhaler prn; pt to call/rto if no improvements or worsening of symptoms; to follow up in 2-3 months for recheck.      Return if symptoms worsen or fail to improve, for 2-3 months for asthma.

## 2022-08-16 NOTE — PATIENT INSTRUCTIONS
HTN: stable, continue HCTZ 12.5 mg 1 tab daily, lisinopril 2.5 mg 1 tab daily. Continue to eat a heart healthy diet, drink plenty of water with goal 64 oz a day and to exercise 3-5 times a week, for more than 30 minutes at a time     Asthma: starting singulair 10 mg daily, advair /21 2 puffs twice a day and albuterol inhaler prn; pt to call/rto if no improvements or worsening of symptoms; to follow up in 2-3 months for recheck.

## 2022-11-22 ENCOUNTER — OFFICE VISIT (OUTPATIENT)
Dept: FAMILY MEDICINE CLINIC | Facility: CLINIC | Age: 32
End: 2022-11-22

## 2022-11-22 VITALS
OXYGEN SATURATION: 99 % | DIASTOLIC BLOOD PRESSURE: 80 MMHG | HEIGHT: 69 IN | TEMPERATURE: 97.3 F | RESPIRATION RATE: 18 BRPM | SYSTOLIC BLOOD PRESSURE: 122 MMHG | BODY MASS INDEX: 34.42 KG/M2 | HEART RATE: 72 BPM | WEIGHT: 232.4 LBS

## 2022-11-22 DIAGNOSIS — J45.20 MILD INTERMITTENT ASTHMA, UNSPECIFIED WHETHER COMPLICATED: ICD-10-CM

## 2022-11-22 DIAGNOSIS — I10 BENIGN ESSENTIAL HYPERTENSION: Primary | ICD-10-CM

## 2022-11-22 DIAGNOSIS — E28.2 PCOS (POLYCYSTIC OVARIAN SYNDROME): ICD-10-CM

## 2022-11-22 PROCEDURE — 99213 OFFICE O/P EST LOW 20 MIN: CPT | Performed by: NURSE PRACTITIONER

## 2022-11-22 NOTE — PROGRESS NOTES
"Isela Reid is a 32 y.o.. female.     Pt here today for follow up on HTN, Asthma and PCOS. Pt stating she is doing well. Pt stating she has been traveling more in past month and has been eating unhealthy. Pt stating she is drinking more water and walking occasionally for exercise. Pt stating she has been doing well with her anxiety. Pt stating her Psychiatrist, Dr. Santoro is recommending for her to stay on Topamax until after she is , which will be oct 2023. Pt stating she has not had any breathing or asthma issues. Pt stating she has occasional used her albuterol but no recurrent issues.       The following portions of the patient's history were reviewed and updated as appropriate: allergies, current medications, past family history, past medical history, past social history, past surgical history and problem list.    Past Medical History:   Diagnosis Date   • Anemia    • Anxiety    • Asthma    • Menstrual problem    • PCOS (polycystic ovarian syndrome)        No past surgical history on file.    Review of Systems   Constitutional: Negative.    Respiratory: Negative.    Cardiovascular: Negative.    Psychiatric/Behavioral: The patient is not nervous/anxious.        Allergies   Allergen Reactions   • Sertraline Palpitations     She had a really fast heart rate after a couple of doses and didn't tolerate a 1/2 dose.          Objective     Vitals:    11/22/22 0754   BP: 122/80   Pulse: 72   Resp: 18   Temp: 97.3 °F (36.3 °C)   TempSrc: Temporal   SpO2: 99%   Weight: 105 kg (232 lb 6.4 oz)   Height: 175.3 cm (69.02\")     Body mass index is 34.3 kg/m².    Physical Exam  Vitals reviewed.   HENT:      Head: Normocephalic.   Eyes:      Pupils: Pupils are equal, round, and reactive to light.   Neck:      Vascular: No carotid bruit.   Cardiovascular:      Rate and Rhythm: Normal rate and regular rhythm.      Pulses: Normal pulses.   Pulmonary:      Effort: Pulmonary effort is normal. No accessory " muscle usage or respiratory distress.      Breath sounds: Normal breath sounds. No stridor. No decreased breath sounds, wheezing, rhonchi or rales.   Musculoskeletal:         General: Normal range of motion.      Right lower leg: No edema.      Left lower leg: No edema.   Skin:     General: Skin is warm and dry.   Neurological:      Mental Status: She is alert and oriented to person, place, and time.   Psychiatric:         Behavior: Behavior normal.           Current Outpatient Medications:   •  albuterol sulfate  (90 Base) MCG/ACT inhaler, Inhale 2 puffs Every 4 (Four) Hours As Needed for Wheezing or Shortness of Air (coughing episodes)., Disp: 18 g, Rfl: 2  •  fluticasone-salmeterol (Advair HFA) 115-21 MCG/ACT inhaler, Inhale 2 puffs 2 (Two) Times a Day., Disp: 12 g, Rfl: 2  •  hydroCHLOROthiazide (HYDRODIURIL) 12.5 MG tablet, Take 1 tablet by mouth Daily., Disp: 90 tablet, Rfl: 1  •  ibuprofen (ADVIL,MOTRIN) 200 MG tablet, Take 200 mg by mouth every 6 (six) hours as needed for mild pain (1-3)., Disp: , Rfl:   •  lisinopril (PRINIVIL,ZESTRIL) 2.5 MG tablet, Take 1 tablet by mouth Daily., Disp: 90 tablet, Rfl: 1  •  Low-Ogestrel 0.3-30 MG-MCG per tablet, Take 1 tablet by mouth Daily., Disp: , Rfl:   •  metFORMIN ER (GLUCOPHAGE-XR) 500 MG 24 hr tablet, Take 2 tablets by mouth Daily With Breakfast., Disp: 180 tablet, Rfl: 1  •  montelukast (Singulair) 10 MG tablet, Take 1 tablet by mouth Every Night., Disp: 30 tablet, Rfl: 2  •  topiramate (TOPAMAX) 100 MG tablet, Take 100 mg by mouth 2 (Two) Times a Day., Disp: , Rfl:       Diagnoses and all orders for this visit:    1. Benign essential hypertension (Primary)    2. Mild intermittent asthma, unspecified whether complicated    3. PCOS (polycystic ovarian syndrome)        Patient Instructions   HTN: stable, continue HCTZ 12.5 mg 1 tab daily, lisinopril 2.5 mg 1 tab daily. Work on eating a heart healthy diet, drink plenty of water with goal 64 oz a day and to  exercise 3-5 times a week, for more than 30 minutes at a time      Asthma: stable, continue Singulair 10 mg daily, advair /21 2 puffs twice a day and albuterol inhaler prn    PCOS: stable, continue metformin 500 mg 2 tabs with breakfast      Return for 6 months for fasting labs and then physical.

## 2022-11-22 NOTE — PATIENT INSTRUCTIONS
HTN: stable, continue HCTZ 12.5 mg 1 tab daily, lisinopril 2.5 mg 1 tab daily. Work on eating a heart healthy diet, drink plenty of water with goal 64 oz a day and to exercise 3-5 times a week, for more than 30 minutes at a time      Asthma: stable, continue Singulair 10 mg daily, advair /21 2 puffs twice a day and albuterol inhaler prn    PCOS: stable, continue metformin 500 mg 2 tabs with breakfast

## 2023-04-01 DIAGNOSIS — I10 BENIGN ESSENTIAL HYPERTENSION: ICD-10-CM

## 2023-04-03 RX ORDER — LISINOPRIL 2.5 MG/1
2.5 TABLET ORAL DAILY
Qty: 90 TABLET | Refills: 1 | Status: SHIPPED | OUTPATIENT
Start: 2023-04-03

## 2023-04-03 NOTE — TELEPHONE ENCOUNTER
Rx Refill Note  Requested Prescriptions     Pending Prescriptions Disp Refills   • lisinopril (PRINIVIL,ZESTRIL) 2.5 MG tablet [Pharmacy Med Name: LISINOPRIL 2.5MG TABLETS] 90 tablet 1     Sig: TAKE 1 TABLET BY MOUTH DAILY      Last office visit with prescribing clinician: 11/22/2022   Last telemedicine visit with prescribing clinician: 6/14/2023   Next office visit with prescribing clinician: 6/21/2023                         Would you like a call back once the refill request has been completed: [] Yes [] No    If the office needs to give you a call back, can they leave a voicemail: [] Yes [] No    Tahmina Barnett MA  04/03/23, 09:24 EDT

## 2023-06-12 DIAGNOSIS — Z00.00 ANNUAL PHYSICAL EXAM: Primary | ICD-10-CM

## 2023-06-12 DIAGNOSIS — I10 BENIGN ESSENTIAL HYPERTENSION: ICD-10-CM

## 2023-06-12 DIAGNOSIS — E55.9 VITAMIN D DEFICIENCY, UNSPECIFIED: ICD-10-CM

## 2023-06-15 LAB
25(OH)D3+25(OH)D2 SERPL-MCNC: 30 NG/ML (ref 30–100)
ALBUMIN SERPL-MCNC: 4.5 G/DL (ref 3.5–5.2)
ALBUMIN/GLOB SERPL: 1.6 G/DL
ALP SERPL-CCNC: 47 U/L (ref 39–117)
ALT SERPL-CCNC: 10 U/L (ref 1–33)
APPEARANCE UR: CLEAR
AST SERPL-CCNC: 15 U/L (ref 1–32)
BACTERIA #/AREA URNS HPF: NORMAL /HPF
BASOPHILS # BLD AUTO: 0.07 10*3/MM3 (ref 0–0.2)
BASOPHILS NFR BLD AUTO: 0.8 % (ref 0–1.5)
BILIRUB SERPL-MCNC: 0.2 MG/DL (ref 0–1.2)
BILIRUB UR QL STRIP: NEGATIVE
BUN SERPL-MCNC: 11 MG/DL (ref 6–20)
BUN/CREAT SERPL: 12.4 (ref 7–25)
CALCIUM SERPL-MCNC: 9.8 MG/DL (ref 8.6–10.5)
CASTS URNS MICRO: NORMAL
CHLORIDE SERPL-SCNC: 104 MMOL/L (ref 98–107)
CHOLEST SERPL-MCNC: 177 MG/DL (ref 0–200)
CO2 SERPL-SCNC: 20.6 MMOL/L (ref 22–29)
COLOR UR: YELLOW
CREAT SERPL-MCNC: 0.89 MG/DL (ref 0.57–1)
EGFRCR SERPLBLD CKD-EPI 2021: 86.8 ML/MIN/1.73
EOSINOPHIL # BLD AUTO: 0.47 10*3/MM3 (ref 0–0.4)
EOSINOPHIL NFR BLD AUTO: 5.2 % (ref 0.3–6.2)
EPI CELLS #/AREA URNS HPF: NORMAL /HPF
ERYTHROCYTE [DISTWIDTH] IN BLOOD BY AUTOMATED COUNT: 12.5 % (ref 12.3–15.4)
GLOBULIN SER CALC-MCNC: 2.8 GM/DL
GLUCOSE SERPL-MCNC: 99 MG/DL (ref 65–99)
GLUCOSE UR QL STRIP: NEGATIVE
HBA1C MFR BLD: 5.6 % (ref 4.8–5.6)
HCT VFR BLD AUTO: 39 % (ref 34–46.6)
HDLC SERPL-MCNC: 53 MG/DL (ref 40–60)
HGB BLD-MCNC: 12.7 G/DL (ref 12–15.9)
HGB UR QL STRIP: NEGATIVE
IMM GRANULOCYTES # BLD AUTO: 0.03 10*3/MM3 (ref 0–0.05)
IMM GRANULOCYTES NFR BLD AUTO: 0.3 % (ref 0–0.5)
KETONES UR QL STRIP: NEGATIVE
LDLC SERPL CALC-MCNC: 103 MG/DL (ref 0–100)
LDLC/HDLC SERPL: 1.89 {RATIO}
LEUKOCYTE ESTERASE UR QL STRIP: NEGATIVE
LYMPHOCYTES # BLD AUTO: 2.58 10*3/MM3 (ref 0.7–3.1)
LYMPHOCYTES NFR BLD AUTO: 28.8 % (ref 19.6–45.3)
MCH RBC QN AUTO: 26.7 PG (ref 26.6–33)
MCHC RBC AUTO-ENTMCNC: 32.6 G/DL (ref 31.5–35.7)
MCV RBC AUTO: 81.9 FL (ref 79–97)
MONOCYTES # BLD AUTO: 0.51 10*3/MM3 (ref 0.1–0.9)
MONOCYTES NFR BLD AUTO: 5.7 % (ref 5–12)
NEUTROPHILS # BLD AUTO: 5.3 10*3/MM3 (ref 1.7–7)
NEUTROPHILS NFR BLD AUTO: 59.2 % (ref 42.7–76)
NITRITE UR QL STRIP: NEGATIVE
NRBC BLD AUTO-RTO: 0 /100 WBC (ref 0–0.2)
PH UR STRIP: 6 [PH] (ref 5–8)
PLATELET # BLD AUTO: 293 10*3/MM3 (ref 140–450)
POTASSIUM SERPL-SCNC: 4.2 MMOL/L (ref 3.5–5.2)
PROT SERPL-MCNC: 7.3 G/DL (ref 6–8.5)
PROT UR QL STRIP: NEGATIVE
RBC # BLD AUTO: 4.76 10*6/MM3 (ref 3.77–5.28)
RBC #/AREA URNS HPF: NORMAL /HPF
SODIUM SERPL-SCNC: 138 MMOL/L (ref 136–145)
SP GR UR STRIP: NORMAL (ref 1–1.03)
TRIGL SERPL-MCNC: 118 MG/DL (ref 0–150)
TSH SERPL DL<=0.005 MIU/L-ACNC: 2.15 UIU/ML (ref 0.27–4.2)
UROBILINOGEN UR STRIP-MCNC: NORMAL MG/DL
VLDLC SERPL CALC-MCNC: 21 MG/DL (ref 5–40)
WBC # BLD AUTO: 8.96 10*3/MM3 (ref 3.4–10.8)
WBC #/AREA URNS HPF: NORMAL /HPF

## 2023-09-07 DIAGNOSIS — I10 BENIGN ESSENTIAL HYPERTENSION: ICD-10-CM

## 2023-09-07 RX ORDER — HYDROCHLOROTHIAZIDE 12.5 MG/1
12.5 TABLET ORAL DAILY
Qty: 90 TABLET | Refills: 0 | Status: SHIPPED | OUTPATIENT
Start: 2023-09-07

## 2023-09-18 DIAGNOSIS — E28.2 PCOS (POLYCYSTIC OVARIAN SYNDROME): ICD-10-CM

## 2023-09-18 RX ORDER — METFORMIN HYDROCHLORIDE 500 MG/1
1000 TABLET, EXTENDED RELEASE ORAL
Qty: 180 TABLET | Refills: 1 | Status: SHIPPED | OUTPATIENT
Start: 2023-09-18

## 2023-12-11 DIAGNOSIS — I10 BENIGN ESSENTIAL HYPERTENSION: ICD-10-CM

## 2023-12-11 RX ORDER — HYDROCHLOROTHIAZIDE 12.5 MG/1
12.5 TABLET ORAL DAILY
Qty: 90 TABLET | Refills: 0 | Status: SHIPPED | OUTPATIENT
Start: 2023-12-11 | End: 2023-12-18

## 2023-12-18 DIAGNOSIS — I10 BENIGN ESSENTIAL HYPERTENSION: ICD-10-CM

## 2023-12-18 RX ORDER — HYDROCHLOROTHIAZIDE 12.5 MG/1
12.5 TABLET ORAL DAILY
Qty: 90 TABLET | Refills: 0 | Status: SHIPPED | OUTPATIENT
Start: 2023-12-18